# Patient Record
Sex: MALE | Race: WHITE | NOT HISPANIC OR LATINO | Employment: OTHER | URBAN - METROPOLITAN AREA
[De-identification: names, ages, dates, MRNs, and addresses within clinical notes are randomized per-mention and may not be internally consistent; named-entity substitution may affect disease eponyms.]

---

## 2018-06-23 ENCOUNTER — HOSPITAL ENCOUNTER (EMERGENCY)
Facility: HOSPITAL | Age: 63
End: 2018-06-25
Attending: EMERGENCY MEDICINE | Admitting: PSYCHIATRY & NEUROLOGY
Payer: COMMERCIAL

## 2018-06-23 DIAGNOSIS — Z00.00 ROUTINE HISTORY AND PHYSICAL EXAMINATION OF ADULT: ICD-10-CM

## 2018-06-23 DIAGNOSIS — T42.4X2A INTENTIONAL BENZODIAZEPINE OVERDOSE, INITIAL ENCOUNTER (HCC): Primary | ICD-10-CM

## 2018-06-23 DIAGNOSIS — Z00.00 PHYSICAL EXAM: ICD-10-CM

## 2018-06-23 DIAGNOSIS — R45.851 SUICIDAL INTENT: ICD-10-CM

## 2018-06-23 DIAGNOSIS — T50.902A INTENTIONAL OVERDOSE OF DRUG IN TABLET FORM (HCC): ICD-10-CM

## 2018-06-23 LAB
ALBUMIN SERPL BCP-MCNC: 3.6 G/DL (ref 3.5–5)
ALP SERPL-CCNC: 59 U/L (ref 46–116)
ALT SERPL W P-5'-P-CCNC: 29 U/L (ref 12–78)
AMPHETAMINES SERPL QL SCN: NEGATIVE
ANION GAP SERPL CALCULATED.3IONS-SCNC: 8 MMOL/L (ref 4–13)
APAP SERPL-MCNC: <2 UG/ML (ref 10–30)
AST SERPL W P-5'-P-CCNC: 27 U/L (ref 5–45)
BARBITURATES UR QL: NEGATIVE
BASOPHILS # BLD AUTO: 0.06 THOUSANDS/ΜL (ref 0–0.1)
BASOPHILS NFR BLD AUTO: 1 % (ref 0–1)
BENZODIAZ UR QL: POSITIVE
BILIRUB SERPL-MCNC: 0.83 MG/DL (ref 0.2–1)
BUN SERPL-MCNC: 17 MG/DL (ref 5–25)
CALCIUM SERPL-MCNC: 9.1 MG/DL (ref 8.3–10.1)
CHLORIDE SERPL-SCNC: 103 MMOL/L (ref 100–108)
CO2 SERPL-SCNC: 31 MMOL/L (ref 21–32)
COCAINE UR QL: NEGATIVE
CREAT SERPL-MCNC: 0.99 MG/DL (ref 0.6–1.3)
EOSINOPHIL # BLD AUTO: 0.18 THOUSAND/ΜL (ref 0–0.61)
EOSINOPHIL NFR BLD AUTO: 2 % (ref 0–6)
ERYTHROCYTE [DISTWIDTH] IN BLOOD BY AUTOMATED COUNT: 13.5 % (ref 11.6–15.1)
ETHANOL SERPL-MCNC: <3 MG/DL (ref 0–3)
GFR SERPL CREATININE-BSD FRML MDRD: 81 ML/MIN/1.73SQ M
GLUCOSE SERPL-MCNC: 67 MG/DL (ref 65–140)
HCT VFR BLD AUTO: 47.8 % (ref 36.5–49.3)
HGB BLD-MCNC: 16.1 G/DL (ref 12–17)
LYMPHOCYTES # BLD AUTO: 1.55 THOUSANDS/ΜL (ref 0.6–4.47)
LYMPHOCYTES NFR BLD AUTO: 18 % (ref 14–44)
MCH RBC QN AUTO: 32.7 PG (ref 26.8–34.3)
MCHC RBC AUTO-ENTMCNC: 33.7 G/DL (ref 31.4–37.4)
MCV RBC AUTO: 97 FL (ref 82–98)
METHADONE UR QL: NEGATIVE
MONOCYTES # BLD AUTO: 0.76 THOUSAND/ΜL (ref 0.17–1.22)
MONOCYTES NFR BLD AUTO: 9 % (ref 4–12)
NEUTROPHILS # BLD AUTO: 6.07 THOUSANDS/ΜL (ref 1.85–7.62)
NEUTS SEG NFR BLD AUTO: 70 % (ref 43–75)
NRBC BLD AUTO-RTO: 0 /100 WBCS
OPIATES UR QL SCN: NEGATIVE
PCP UR QL: NEGATIVE
PLATELET # BLD AUTO: 89 THOUSANDS/UL (ref 149–390)
PMV BLD AUTO: 11 FL (ref 8.9–12.7)
POTASSIUM SERPL-SCNC: 4.8 MMOL/L (ref 3.5–5.3)
PROT SERPL-MCNC: 6.5 G/DL (ref 6.4–8.2)
RBC # BLD AUTO: 4.93 MILLION/UL (ref 3.88–5.62)
SALICYLATES SERPL-MCNC: <3 MG/DL (ref 3–20)
SODIUM SERPL-SCNC: 142 MMOL/L (ref 136–145)
THC UR QL: NEGATIVE
TSH SERPL DL<=0.05 MIU/L-ACNC: 0.45 UIU/ML (ref 0.36–3.74)
WBC # BLD AUTO: 8.62 THOUSAND/UL (ref 4.31–10.16)

## 2018-06-23 PROCEDURE — 85025 COMPLETE CBC W/AUTO DIFF WBC: CPT | Performed by: EMERGENCY MEDICINE

## 2018-06-23 PROCEDURE — 84443 ASSAY THYROID STIM HORMONE: CPT | Performed by: EMERGENCY MEDICINE

## 2018-06-23 PROCEDURE — 93005 ELECTROCARDIOGRAM TRACING: CPT

## 2018-06-23 PROCEDURE — 80329 ANALGESICS NON-OPIOID 1 OR 2: CPT | Performed by: EMERGENCY MEDICINE

## 2018-06-23 PROCEDURE — 80320 DRUG SCREEN QUANTALCOHOLS: CPT | Performed by: EMERGENCY MEDICINE

## 2018-06-23 PROCEDURE — 80307 DRUG TEST PRSMV CHEM ANLYZR: CPT | Performed by: EMERGENCY MEDICINE

## 2018-06-23 PROCEDURE — 36415 COLL VENOUS BLD VENIPUNCTURE: CPT | Performed by: EMERGENCY MEDICINE

## 2018-06-23 PROCEDURE — 96360 HYDRATION IV INFUSION INIT: CPT

## 2018-06-23 PROCEDURE — 80053 COMPREHEN METABOLIC PANEL: CPT | Performed by: EMERGENCY MEDICINE

## 2018-06-23 RX ORDER — BIOTIN 1 MG
5000 TABLET ORAL DAILY
COMMUNITY

## 2018-06-23 RX ORDER — ALLOPURINOL 100 MG/1
100 TABLET ORAL 2 TIMES DAILY
COMMUNITY
Start: 2018-05-18 | End: 2018-06-27 | Stop reason: HOSPADM

## 2018-06-23 RX ORDER — UBIDECARENONE 75 MG
1000 CAPSULE ORAL DAILY
COMMUNITY

## 2018-06-23 RX ORDER — ALPRAZOLAM 0.5 MG/1
0.5 TABLET ORAL 2 TIMES DAILY
COMMUNITY
Start: 2018-06-15 | End: 2018-06-24

## 2018-06-23 RX ORDER — LISINOPRIL 5 MG/1
5 TABLET ORAL DAILY
COMMUNITY
Start: 2018-05-18 | End: 2018-06-27 | Stop reason: HOSPADM

## 2018-06-23 RX ORDER — LEVOTHYROXINE SODIUM 0.1 MG/1
100 TABLET ORAL DAILY
COMMUNITY
Start: 2018-05-18 | End: 2018-06-27 | Stop reason: HOSPADM

## 2018-06-23 RX ORDER — ASCORBIC ACID 100 MG
100 TABLET,CHEWABLE ORAL DAILY
COMMUNITY

## 2018-06-23 RX ADMIN — SODIUM CHLORIDE 1000 ML: 0.9 INJECTION, SOLUTION INTRAVENOUS at 16:13

## 2018-06-23 NOTE — ED NOTES
Visitor Narcisa Pulliam) left contact information for patient  [phone]       Liberty Cobos  06/23/18 1554

## 2018-06-23 NOTE — ED PROVIDER NOTES
History  Chief Complaint   Patient presents with    Overdose - Intentional     Patient arrives via EMS from home  Patient reports taking 20 0 5mg Xanax and 30 0 05mg Synthroid this morning around 9am or 10am in an attempt to kill himself  Patient reports SI on and off for an unspecified amount of time  60 yo male brought from a residence where he has a self contained living space, but with roommates who share the common area, for alleged overdose on prescription medication  He freely discloses that he took approximately #20 alprazolam 0 5 mg pills and approximately #30 synthroid 50mcg pills, which corresponds to about a handful of each at 9am   He says he took them with the intent to die, citing nonspecific depression symptoms and a few feeling that "the world would be better without him "  He is actually not sure how the incident came to light, because as far as he knew he was in his apartment, and he just recalls being picked up by paramedics  He did not seek help himself, although now he is cooperative and agreeable  He is visibly under the influence of sedative hypnotic c/w benzodiazepine, somnolent but arousable, and slurs his words, but is deliberate and contemplative with his words, oriented x4  In the course of my conversation with him, he admitted to previous attempt by overdose a few years ago ,and asked me "why if someone is determined to kill themselves do you try to save them "          History provided by:  Patient      Prior to Admission Medications   Prescriptions Last Dose Informant Patient Reported? Taking?    Ascorbic Acid (VITAMIN C) 100 MG CHEW   Yes No   Sig: Chew 100 mg daily   Cholecalciferol (VITAMIN D3) 1000 units CAPS   Yes No   Sig: Take 5,000 Units by mouth daily   allopurinol (ZYLOPRIM) 100 mg tablet   Yes Yes   Sig: Take 100 mg by mouth 2 (two) times a day   cyanocobalamin (VITAMIN B-12) 100 mcg tablet   Yes No   Sig: Take 1,000 mcg by mouth daily   levothyroxine 100 mcg tablet   Yes Yes   Sig: Take 100 mcg by mouth daily   lisinopril (ZESTRIL) 5 mg tablet   Yes Yes   Sig: Take 5 mg by mouth daily      Facility-Administered Medications: None       Past Medical History:   Diagnosis Date    Anxiety     Depression     Disease of thyroid gland     Hypertension     Panic attack        Past Surgical History:   Procedure Laterality Date    APPENDECTOMY      COSMETIC SURGERY      HERNIA REPAIR      JOINT REPLACEMENT         History reviewed  No pertinent family history  I have reviewed and agree with the history as documented  Social History   Substance Use Topics    Smoking status: Former Smoker     Quit date: 6/25/2013    Smokeless tobacco: Never Used    Alcohol use 1 2 oz/week     2 Glasses of wine per week      Comment: Wine with dinner everyday  Review of Systems   Constitutional: Negative for appetite change, chills and fever  HENT: Negative for sore throat  Respiratory: Negative for cough, shortness of breath and wheezing  Cardiovascular: Negative for chest pain and palpitations  Gastrointestinal: Negative for abdominal pain, diarrhea, nausea and vomiting  Genitourinary: Negative for dysuria and hematuria  Musculoskeletal: Negative for neck pain  Skin: Negative for rash  Neurological: Negative for dizziness, weakness and headaches  Psychiatric/Behavioral: Positive for suicidal ideas  All other systems reviewed and are negative  Physical Exam  Physical Exam   Constitutional: He is oriented to person, place, and time  He appears well-developed and well-nourished  HENT:   Head: Normocephalic and atraumatic  Right Ear: External ear normal    Left Ear: External ear normal    Nose: Nose normal    Mouth/Throat: Oropharynx is clear and moist    Eyes: Conjunctivae and EOM are normal  Pupils are equal, round, and reactive to light  Neck: Normal range of motion  Neck supple  Cardiovascular: Normal rate and regular rhythm  Pulmonary/Chest: Effort normal    Abdominal: There is no tenderness  Musculoskeletal: Normal range of motion  Neurological: He is alert and oriented to person, place, and time  No cranial nerve deficit or sensory deficit  Coordination normal  GCS eye subscore is 3  GCS verbal subscore is 5  GCS motor subscore is 6  Skin: Skin is warm and dry  Capillary refill takes less than 2 seconds  Psychiatric: He has a normal mood and affect  Thought content normal  His speech is slurred  He is slowed  He expresses impulsivity (he actually said "this was impulsive "  )  Nursing note and vitals reviewed        Vital Signs  ED Triage Vitals [06/23/18 1507]   Temperature Pulse Respirations Blood Pressure SpO2   97 5 °F (36 4 °C) 70 14 140/89 100 %      Temp Source Heart Rate Source Patient Position - Orthostatic VS BP Location FiO2 (%)   Oral Monitor Lying Right arm --      Pain Score       No Pain           Vitals:    06/24/18 0907 06/24/18 1545 06/24/18 1858 06/24/18 1901   BP: 121/90 134/78 121/58 121/58   Pulse:  84 80    Patient Position - Orthostatic VS:  Lying Lying Lying       Visual Acuity  Visual Acuity      Most Recent Value   L Pupil Size (mm)  3   R Pupil Size (mm)  3          ED Medications  Medications   sodium chloride 0 9 % bolus 1,000 mL (0 mL Intravenous Stopped 6/23/18 1718)       Diagnostic Studies  Results Reviewed     Procedure Component Value Units Date/Time    Comprehensive metabolic panel [[id number]] Collected:  06/24/18 0943    Lab Status:  Final result Specimen:  Blood from Arm, Left Updated:  06/24/18 1021     Sodium 139 mmol/L      Potassium 4 2 mmol/L      Chloride 102 mmol/L      CO2 29 mmol/L      Anion Gap 8 mmol/L      BUN 18 mg/dL      Creatinine 1 18 mg/dL      Glucose 122 mg/dL      Calcium 9 3 mg/dL      AST 30 U/L      ALT 30 U/L      Alkaline Phosphatase 64 U/L      Total Protein 6 8 g/dL      Albumin 3 6 g/dL      Total Bilirubin 0 82 mg/dL      eGFR 66 ml/min/1 73sq m Narrative:         National Kidney Disease Education Program recommendations are as follows:  GFR calculation is accurate only with a steady state creatinine  Chronic Kidney disease less than 60 ml/min/1 73 sq  meters  Kidney failure less than 15 ml/min/1 73 sq  meters  TSH [[id number]]  (Abnormal) Collected:  06/24/18 0943    Lab Status:  Final result Specimen:  Blood from Arm, Left Updated:  06/24/18 1021     TSH 3RD GENERATON 0 219 (L) uIU/mL     Narrative:         Patients undergoing fluorescein dye angiography may retain small amounts of fluorescein in the body for 48-72 hours post procedure  Samples containing fluorescein can produce falsely depressed TSH values  If the patient had this procedure,a specimen should be resubmitted post fluorescein clearance      Urine Microscopic [[id number]]  (Abnormal) Collected:  06/24/18 0946    Lab Status:  Final result Specimen:  Urine from Urine, Clean Catch Updated:  06/24/18 0956     RBC, UA None Seen /hpf      WBC, UA None Seen /hpf      Epithelial Cells Occasional /hpf      Bacteria, UA Occasional /hpf      MUCOUS THREADS Moderate (A)    CBC and differential [[id number]]  (Abnormal) Collected:  06/24/18 0943    Lab Status:  Final result Specimen:  Blood from Arm, Left Updated:  06/24/18 0949     WBC 7 22 Thousand/uL      RBC 4 96 Million/uL      Hemoglobin 16 2 g/dL      Hematocrit 47 9 %      MCV 97 fL      MCH 32 7 pg      MCHC 33 8 g/dL      RDW 13 6 %      MPV 11 2 fL      Platelets 249 (L) Thousands/uL      nRBC 0 /100 WBCs      Neutrophils Relative 63 %      Lymphocytes Relative 25 %      Monocytes Relative 8 %      Eosinophils Relative 4 %      Basophils Relative 1 %      Neutrophils Absolute 4 52 Thousands/µL      Lymphocytes Absolute 1 82 Thousands/µL      Monocytes Absolute 0 57 Thousand/µL      Eosinophils Absolute 0 25 Thousand/µL      Basophils Absolute 0 06 Thousands/µL     POCT urinalysis dipstick [[id number]]  (Normal) Resulted:  06/24/18 0946    Lab Status: Final result Specimen:  Urine Updated:  06/24/18 0946     Color, UA yellow    ED Urine Macroscopic [[id number]]  (Abnormal) Collected:  06/24/18 0946    Lab Status:  Final result Specimen:  Urine Updated:  06/24/18 0941     Color, UA Yellow     Clarity, UA Clear     pH, UA 5 0     Leukocytes, UA Negative     Nitrite, UA Negative     Protein,  (2+) (A) mg/dl      Glucose, UA Negative mg/dl      Ketones, UA Negative mg/dl      Urobilinogen, UA 0 2 E U /dl      Bilirubin, UA Negative     Blood, UA Negative     Specific Gravity, UA 1 020    Narrative:       CLINITEK RESULT    Rapid drug screen, urine [[id number]]  (Abnormal) Collected:  06/23/18 2023    Lab Status:  Final result Specimen:  Urine from Urine, Clean Catch Updated:  06/23/18 2046     Amph/Meth UR Negative     Barbiturate Ur Negative     Benzodiazepine Urine Positive (A)     Cocaine Urine Negative     Methadone Urine Negative     Opiate Urine Negative     PCP Ur Negative     THC Urine Negative    Narrative:         Presumptive report  If requested, specimen will be sent to reference lab for confirmation  FOR MEDICAL PURPOSES ONLY  IF CONFIRMATION NEEDED PLEASE CONTACT THE LAB WITHIN 5 DAYS      Drug Screen Cutoff Levels:  AMPHETAMINE/METHAMPHETAMINES  1000 ng/mL  BARBITURATES     200 ng/mL  BENZODIAZEPINES     200 ng/mL  COCAINE      300 ng/mL  METHADONE      300 ng/mL  OPIATES      300 ng/mL  PHENCYCLIDINE     25 ng/mL  THC       50 ng/mL    Acetaminophen level [[id number]]  (Abnormal) Collected:  06/23/18 1611    Lab Status:  Final result Specimen:  Blood from Arm, Left Updated:  06/23/18 1659     Acetaminophen Level <2 0 (L) ug/mL     TSH [[id number]]  (Normal) Collected:  06/23/18 1611    Lab Status:  Final result Specimen:  Blood from Arm, Left Updated:  06/23/18 1645     TSH 3RD GENERATON 0 445 uIU/mL     Narrative:         Patients undergoing fluorescein dye angiography may retain small amounts of fluorescein in the body for 48-72 hours post procedure  Samples containing fluorescein can produce falsely depressed TSH values  If the patient had this procedure,a specimen should be resubmitted post fluorescein clearance      Salicylate level [[id number]]  (Abnormal) Collected:  06/23/18 1611    Lab Status:  Final result Specimen:  Blood from Arm, Left Updated:  18/60/69 5227     Salicylate Lvl <3 (L) mg/dL     Ethanol [[id number]]  (Normal) Collected:  06/23/18 1611    Lab Status:  Final result Specimen:  Blood from Arm, Left Updated:  06/23/18 1640     Ethanol Lvl <3 mg/dL     CBC and differential [[id number]]  (Abnormal) Collected:  06/23/18 1611    Lab Status:  Final result Specimen:  Blood from Arm, Left Updated:  06/23/18 1639     WBC 8 62 Thousand/uL      RBC 4 93 Million/uL      Hemoglobin 16 1 g/dL      Hematocrit 47 8 %      MCV 97 fL      MCH 32 7 pg      MCHC 33 7 g/dL      RDW 13 5 %      MPV 11 0 fL      Platelets 89 (L) Thousands/uL      nRBC 0 /100 WBCs      Neutrophils Relative 70 %      Lymphocytes Relative 18 %      Monocytes Relative 9 %      Eosinophils Relative 2 %      Basophils Relative 1 %      Neutrophils Absolute 6 07 Thousands/µL      Lymphocytes Absolute 1 55 Thousands/µL      Monocytes Absolute 0 76 Thousand/µL      Eosinophils Absolute 0 18 Thousand/µL      Basophils Absolute 0 06 Thousands/µL     Comprehensive metabolic panel [[id number]] Collected:  06/23/18 1611    Lab Status:  Final result Specimen:  Blood from Arm, Left Updated:  06/23/18 1636     Sodium 142 mmol/L      Potassium 4 8 mmol/L      Chloride 103 mmol/L      CO2 31 mmol/L      Anion Gap 8 mmol/L      BUN 17 mg/dL      Creatinine 0 99 mg/dL      Glucose 67 mg/dL      Calcium 9 1 mg/dL      AST 27 U/L      ALT 29 U/L      Alkaline Phosphatase 59 U/L      Total Protein 6 5 g/dL      Albumin 3 6 g/dL      Total Bilirubin 0 83 mg/dL      eGFR 81 ml/min/1 73sq m     Narrative:         National Kidney Disease Education Program recommendations are as follows:  GFR calculation is accurate only with a steady state creatinine  Chronic Kidney disease less than 60 ml/min/1 73 sq  meters  Kidney failure less than 15 ml/min/1 73 sq  meters  No orders to display              Procedures  ECG 12 Lead Documentation  Date/Time: 6/23/2018 3:20 PM  Performed by: Yoav Vaughn  Authorized by: Yoav Vaughn     Rate:     ECG rate:  76  Rhythm:     Rhythm: sinus rhythm    Ectopy:     Ectopy: PVCs    ST segments:     ST segments:  Normal  T waves:     T waves: normal             Phone Contacts  ED Phone Contact    ED Course  ED Course as of Jun 25 0650   Sat Jun 23, 2018   1551 Reviewed PA , confirmed alprazolam 0 5 mg filled 6/16/18    1646 He has no adverse effects of alleged levothyroxine, and he is just exhibiting the sedative effects of  BZD without any respiratory depression nor airway control issues at this time    1900 I have handed over care to Dr Helen White for continued observation, with the goal of crisis evaluation  He is sitting up, purposeful, still under influence of sedative effects but improving  Sun Jun 24, 2018   0630 After he rested through the night under care of supervising physician overnight, I am managing him again this morning   He is waiting for bed approval       0040 I have been told that accepting facility in [place] is asking for repeat labs to confirm that they are still as normal as they were yesterday, and a full 24 hour observation period and to confirm that he is as alert and oriented as he has been in the interval since arrival       1408 Patient has been under observation for 24 hours with no abnormal or concerning sequelae of alleged overdose  Workup is normal   He is considered medically cleared for transport to inpatient psychiatric unit                                    [facility] Time    Disposition  Final diagnoses:   Intentional benzodiazepine overdose, initial encounter [facility])   Intentional overdose of drug in tablet form [facility])   Suicidal intent     Time reflects when diagnosis was documented in both MDM as applicable and the Disposition within this note     Time User Action Codes Description Comment    6/23/2018  4:50 PM Van, [address] Intentional benzodiazepine overdose, initial encounter ([facility] [address] )     6/23/2018  4:50 PM [address] Intentional overdose of drug in tablet form ([facility] [address] )     6/23/2018  4:50 PM Van, [address] Suicidal intent     6/24/2018  1:11 PM Pam Cody Add [Z00 00] Physical exam     6/24/2018  1:12 PM Maci Cody Prost C Add [Z00 00] Routine history and physical examination of adult     6/24/2018  1:12 PM Maci Cody C Modify [Z00 00] Routine history and physical examination of adult       ED Disposition     ED Disposition Condition Comment    Transfer to Another [address] should be transferred out to [facility]        MD Documentation      Most Recent Value   Patient Condition  The patient has been stabilized such that within reasonable medical probability, no material deterioration of the patient condition or the condition of the unborn child(los) is likely to result from the transfer   Reason for Transfer  Level of Care needed not available at this facility   Benefits of Transfer  Other benefits (Include comment)_______________________ Sparkle Delude and stabilize]   Risks of Transfer  Potential for delay in receiving treatment [Inpatient Psychiatry]   Accepting Physician  Dr Bahman Joshi Name, Mirela Aparicio    (Name & Tel number)  Mandi Hope [phone]   Transported by (Company and Unit #)  [facility]   Sending MD Dr Dubose   Provider Certification  General risk, such as traffic hazards, adverse weather conditions, rough terrain or turbulence, possible failure of equipment (including vehicle or aircraft), or consequences of actions of persons outside the control of the transport personnel, The patient is stable for psychiatric transfer because they are medically stable, and is protected from harming him/herself or others during transport, The possibility of a transport vehicle being unavailable      RN Documentation      Most [address] Name, [address]    (Name & Tel number)  Yane Randolph [phone]   Report Given to  Report to be called at [phone]   Transported by [employer] and Unit #)  [facility]   Level of Care  Basic life support      Follow-up Information    None         Discharge Medication List as of 6/25/2018  1:01 AM      CONTINUE these medications which have NOT CHANGED    Details   allopurinol (ZYLOPRIM) 100 mg tablet Take 100 mg by mouth 2 (two) times a day, Starting Fri 5/18/2018, Historical Med      Ascorbic Acid (VITAMIN C) 100 MG CHEW Chew 100 mg daily, Historical Med      Cholecalciferol (VITAMIN D3) 1000 units CAPS Take 5,000 Units by mouth daily, Historical Med      cyanocobalamin (VITAMIN B-12) 100 mcg tablet Take 1,000 mcg by mouth daily, Historical Med      levothyroxine 100 mcg tablet Take 100 mcg by mouth daily, Starting Fri 5/18/2018, Historical Med      lisinopril (ZESTRIL) 5 mg tablet Take 5 mg by mouth daily, Starting Fri 5/18/2018, Historical Med           No discharge procedures on file      ED Provider  Electronically Signed by           Apollo Casillas MD  06/25/18 8104

## 2018-06-23 NOTE — ED NOTES
Jose F from crisis will be in shortly, after he is finished at [facility] to see patient     Trevin Paz, SULY  06/23/18 5999

## 2018-06-23 NOTE — ED NOTES
Patient sleeping in bed, B/L side rails up so patient does not fall [address], RN  06/23/18 6677

## 2018-06-23 NOTE — ED NOTES
Lela Pedroza expresses concerns about patient being able to call for assistance if needed  RN explains that patient is under continual observation by monitors and auditory in room  If patient needs something he can call for nurse or get OOB and come to window for assist  RN explains that patient's cannot have call bells in these rooms due to safety concerns         Kian Alcaraz RN  06/23/18 2259

## 2018-06-23 NOTE — ED NOTES
Per Dr Jose Alejandro Smith patient medically cleared at this time        Orma Goltz, RN  06/23/18 8976

## 2018-06-23 NOTE — ED NOTES
Patient requesting for credit card in wallet to be given to family friend Morteza Johnson  Credit card given to Morteza Johnson  Wallet and belongings put back into locker and secured by SULY Milan  06/23/18 7141

## 2018-06-23 NOTE — ED NOTES
Spoke with Yasmin Billy from Crisis, aware of patient and consult   Per Denzel Back from crisis is on for [facility] and she will speak with him to see if he needs assistance with the consult for this patient     Griselda Singh RN  06/23/18 3897

## 2018-06-23 NOTE — ED NOTES
Pt is to sedated to talk to CW at this time  CW will attempt to evaluate the pt once he is more alert

## 2018-06-24 VITALS
OXYGEN SATURATION: 99 % | SYSTOLIC BLOOD PRESSURE: 121 MMHG | RESPIRATION RATE: 16 BRPM | WEIGHT: 198.19 LBS | HEART RATE: 80 BPM | DIASTOLIC BLOOD PRESSURE: 58 MMHG | TEMPERATURE: 98.1 F

## 2018-06-24 PROBLEM — R63.4 WEIGHT LOSS: Status: ACTIVE | Noted: 2017-08-17

## 2018-06-24 PROBLEM — T50.902D OVERDOSE, INTENTIONAL SELF-HARM, SUBSEQUENT ENCOUNTER: Status: ACTIVE | Noted: 2018-06-24

## 2018-06-24 PROBLEM — L98.7 EXCESS SKIN OF ABDOMINAL WALL: Status: ACTIVE | Noted: 2017-08-17

## 2018-06-24 PROBLEM — R53.83 TIREDNESS: Status: ACTIVE | Noted: 2018-03-12

## 2018-06-24 PROBLEM — N40.2 PROSTATE NODULE: Status: ACTIVE | Noted: 2017-04-13

## 2018-06-24 PROBLEM — D69.6 THROMBOCYTOPENIA (HCC): Status: ACTIVE | Noted: 2017-08-17

## 2018-06-24 LAB
ALBUMIN SERPL BCP-MCNC: 3.6 G/DL (ref 3.5–5)
ALP SERPL-CCNC: 64 U/L (ref 46–116)
ALT SERPL W P-5'-P-CCNC: 30 U/L (ref 12–78)
ANION GAP SERPL CALCULATED.3IONS-SCNC: 8 MMOL/L (ref 4–13)
AST SERPL W P-5'-P-CCNC: 30 U/L (ref 5–45)
ATRIAL RATE: 76 BPM
BACTERIA UR QL AUTO: ABNORMAL /HPF
BASOPHILS # BLD AUTO: 0.06 THOUSANDS/ΜL (ref 0–0.1)
BASOPHILS NFR BLD AUTO: 1 % (ref 0–1)
BILIRUB SERPL-MCNC: 0.82 MG/DL (ref 0.2–1)
BILIRUB UR QL STRIP: NEGATIVE
BUN SERPL-MCNC: 18 MG/DL (ref 5–25)
CALCIUM SERPL-MCNC: 9.3 MG/DL (ref 8.3–10.1)
CHLORIDE SERPL-SCNC: 102 MMOL/L (ref 100–108)
CLARITY UR: CLEAR
CO2 SERPL-SCNC: 29 MMOL/L (ref 21–32)
COLOR UR: YELLOW
COLOR, POC: YELLOW
CREAT SERPL-MCNC: 1.18 MG/DL (ref 0.6–1.3)
EOSINOPHIL # BLD AUTO: 0.25 THOUSAND/ΜL (ref 0–0.61)
EOSINOPHIL NFR BLD AUTO: 4 % (ref 0–6)
ERYTHROCYTE [DISTWIDTH] IN BLOOD BY AUTOMATED COUNT: 13.6 % (ref 11.6–15.1)
GFR SERPL CREATININE-BSD FRML MDRD: 66 ML/MIN/1.73SQ M
GLUCOSE SERPL-MCNC: 122 MG/DL (ref 65–140)
GLUCOSE UR STRIP-MCNC: NEGATIVE MG/DL
HCT VFR BLD AUTO: 47.9 % (ref 36.5–49.3)
HGB BLD-MCNC: 16.2 G/DL (ref 12–17)
HGB UR QL STRIP.AUTO: NEGATIVE
KETONES UR STRIP-MCNC: NEGATIVE MG/DL
LEUKOCYTE ESTERASE UR QL STRIP: NEGATIVE
LYMPHOCYTES # BLD AUTO: 1.82 THOUSANDS/ΜL (ref 0.6–4.47)
LYMPHOCYTES NFR BLD AUTO: 25 % (ref 14–44)
MCH RBC QN AUTO: 32.7 PG (ref 26.8–34.3)
MCHC RBC AUTO-ENTMCNC: 33.8 G/DL (ref 31.4–37.4)
MCV RBC AUTO: 97 FL (ref 82–98)
MONOCYTES # BLD AUTO: 0.57 THOUSAND/ΜL (ref 0.17–1.22)
MONOCYTES NFR BLD AUTO: 8 % (ref 4–12)
MUCOUS THREADS UR QL AUTO: ABNORMAL
NEUTROPHILS # BLD AUTO: 4.52 THOUSANDS/ΜL (ref 1.85–7.62)
NEUTS SEG NFR BLD AUTO: 63 % (ref 43–75)
NITRITE UR QL STRIP: NEGATIVE
NON-SQ EPI CELLS URNS QL MICRO: ABNORMAL /HPF
NRBC BLD AUTO-RTO: 0 /100 WBCS
P AXIS: 75 DEGREES
PH UR STRIP.AUTO: 5 [PH] (ref 4.5–8)
PLATELET # BLD AUTO: 100 THOUSANDS/UL (ref 149–390)
PMV BLD AUTO: 11.2 FL (ref 8.9–12.7)
POTASSIUM SERPL-SCNC: 4.2 MMOL/L (ref 3.5–5.3)
PR INTERVAL: 188 MS
PROT SERPL-MCNC: 6.8 G/DL (ref 6.4–8.2)
PROT UR STRIP-MCNC: ABNORMAL MG/DL
QRS AXIS: 22 DEGREES
QRSD INTERVAL: 108 MS
QT INTERVAL: 380 MS
QTC INTERVAL: 427 MS
RBC # BLD AUTO: 4.96 MILLION/UL (ref 3.88–5.62)
RBC #/AREA URNS AUTO: ABNORMAL /HPF
SODIUM SERPL-SCNC: 139 MMOL/L (ref 136–145)
SP GR UR STRIP.AUTO: 1.02 (ref 1–1.03)
T WAVE AXIS: 75 DEGREES
TSH SERPL DL<=0.05 MIU/L-ACNC: 0.22 UIU/ML (ref 0.36–3.74)
UROBILINOGEN UR QL STRIP.AUTO: 0.2 E.U./DL
VENTRICULAR RATE: 76 BPM
WBC # BLD AUTO: 7.22 THOUSAND/UL (ref 4.31–10.16)
WBC #/AREA URNS AUTO: ABNORMAL /HPF

## 2018-06-24 PROCEDURE — 84443 ASSAY THYROID STIM HORMONE: CPT | Performed by: EMERGENCY MEDICINE

## 2018-06-24 PROCEDURE — 81001 URINALYSIS AUTO W/SCOPE: CPT

## 2018-06-24 PROCEDURE — 36415 COLL VENOUS BLD VENIPUNCTURE: CPT | Performed by: EMERGENCY MEDICINE

## 2018-06-24 PROCEDURE — 80053 COMPREHEN METABOLIC PANEL: CPT | Performed by: EMERGENCY MEDICINE

## 2018-06-24 PROCEDURE — 93010 ELECTROCARDIOGRAM REPORT: CPT | Performed by: INTERNAL MEDICINE

## 2018-06-24 PROCEDURE — 81002 URINALYSIS NONAUTO W/O SCOPE: CPT | Performed by: EMERGENCY MEDICINE

## 2018-06-24 PROCEDURE — 85025 COMPLETE CBC W/AUTO DIFF WBC: CPT | Performed by: EMERGENCY MEDICINE

## 2018-06-24 RX ORDER — CHOLECALCIFEROL (VITAMIN D3) 125 MCG
1000 CAPSULE ORAL DAILY
Status: CANCELLED | OUTPATIENT
Start: 2018-06-24

## 2018-06-24 RX ORDER — ALLOPURINOL 100 MG/1
100 TABLET ORAL 2 TIMES DAILY
Status: DISCONTINUED | OUTPATIENT
Start: 2018-06-24 | End: 2018-06-25 | Stop reason: HOSPADM

## 2018-06-24 RX ORDER — LEVOTHYROXINE SODIUM 0.05 MG/1
100 TABLET ORAL DAILY
Status: CANCELLED | OUTPATIENT
Start: 2018-06-24

## 2018-06-24 RX ORDER — LISINOPRIL 5 MG/1
5 TABLET ORAL DAILY
Status: CANCELLED | OUTPATIENT
Start: 2018-06-24

## 2018-06-24 RX ORDER — LEVOTHYROXINE SODIUM 0.05 MG/1
100 TABLET ORAL
Status: DISCONTINUED | OUTPATIENT
Start: 2018-06-24 | End: 2018-06-25 | Stop reason: HOSPADM

## 2018-06-24 RX ORDER — BIOTIN 1 MG
5000 TABLET ORAL DAILY
Status: CANCELLED | OUTPATIENT
Start: 2018-06-24

## 2018-06-24 RX ORDER — ALPRAZOLAM 0.5 MG/1
0.5 TABLET ORAL 2 TIMES DAILY
Status: DISCONTINUED | OUTPATIENT
Start: 2018-06-24 | End: 2018-06-25 | Stop reason: HOSPADM

## 2018-06-24 RX ORDER — LORAZEPAM 0.5 MG/1
0.5 TABLET ORAL EVERY 8 HOURS PRN
Status: CANCELLED | OUTPATIENT
Start: 2018-06-24

## 2018-06-24 RX ORDER — ACETAMINOPHEN 325 MG/1
650 TABLET ORAL EVERY 4 HOURS PRN
Status: CANCELLED | OUTPATIENT
Start: 2018-06-24

## 2018-06-24 RX ORDER — TRAZODONE HYDROCHLORIDE 50 MG/1
25 TABLET ORAL
Status: CANCELLED | OUTPATIENT
Start: 2018-06-24

## 2018-06-24 RX ORDER — ALLOPURINOL 100 MG/1
100 TABLET ORAL 2 TIMES DAILY
Status: CANCELLED | OUTPATIENT
Start: 2018-06-24

## 2018-06-24 RX ORDER — ASCORBIC ACID 100 MG
100 TABLET,CHEWABLE ORAL DAILY
Status: CANCELLED | OUTPATIENT
Start: 2018-06-24

## 2018-06-24 RX ORDER — NICOTINE 21 MG/24HR
1 PATCH, TRANSDERMAL 24 HOURS TRANSDERMAL DAILY
Status: CANCELLED | OUTPATIENT
Start: 2018-06-24

## 2018-06-24 RX ORDER — LISINOPRIL 5 MG/1
5 TABLET ORAL DAILY
Status: DISCONTINUED | OUTPATIENT
Start: 2018-06-24 | End: 2018-06-25 | Stop reason: HOSPADM

## 2018-06-24 RX ADMIN — ALLOPURINOL 100 MG: 100 TABLET ORAL at 19:23

## 2018-06-24 RX ADMIN — ALPRAZOLAM 0.5 MG: 0.5 TABLET ORAL at 19:20

## 2018-06-24 RX ADMIN — LEVOTHYROXINE SODIUM 100 MCG: 50 TABLET ORAL at 08:06

## 2018-06-24 RX ADMIN — ALLOPURINOL 100 MG: 100 TABLET ORAL at 09:06

## 2018-06-24 RX ADMIN — ALPRAZOLAM 0.5 MG: 0.5 TABLET ORAL at 09:06

## 2018-06-24 RX ADMIN — LISINOPRIL 5 MG: 5 TABLET ORAL at 09:07

## 2018-06-24 NOTE — ED NOTES
PC with [facility], [address], confirmed they have a bed and will review for their geriatric unit  [facility] requested new labs, including urinalysis  They also requested 24 hour observation in ED before accepting patient due to OD  [facility] [place] also requires a hand written 201  Nurse made aware

## 2018-06-24 NOTE — ED NOTES
Daniele Pedro, patients counselor, left department  Patient remains calm, cooperative, ambulating to bathroom       Aster Umana, SULY  06/24/18 600 [address], RN  06/24/18 8565

## 2018-06-24 NOTE — ED NOTES
Patient states "I actually didn't mean no visitors completely    Specifically I do not want Allean Onondaga to come and visit"     Laura Espino RN  06/24/18 9273

## 2018-06-24 NOTE — ED NOTES
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

## 2018-06-24 NOTE — ED NOTES
Osbaldo Tang called asking if patient is still in ER  Spoke w/patient regarding Lois Chong and her question  Offered patient telephone to speak w/Meaghan  Patient did not want to speak with Lois Chong, but stated ok to tell her that he is still in ER  When informing Lois Chong of this, Lois Chong states "I might want to come and visit, I would bring Shopritesh Mercedes with me"  Informed Lois Chong that she should call for permission from the patient for visit prior to coming to ER         Yue Lazaro RN  06/24/18 9498

## 2018-06-24 NOTE — ED NOTES
Pt states that he has [employer]  The pt doesn't have his card with him  The pt states that it is a private plan  CW called [employer] at [phone] and their offices were closed and the [employer] at [phone] and the line would go dead when transfer to   CW was unable to verify pt private [employer]  Pt states that he can call his roommate later today to see if they can obtain his insurance card and or information  CW did check EVS and pt is eligable

## 2018-06-24 NOTE — ED NOTES
PC with Lashaun Watters, [address] [facility] [phone], received case number: [id number]  UMBERTO Rojas is to [facility] once patient arrives

## 2018-06-24 NOTE — ED NOTES
Received call from crisis worker Nancy Stiles states that [facility] is reviewing patient and they will call once decision is made       Cornell Casillas RN  06/24/18 0111

## 2018-06-24 NOTE — ED NOTES
Received call from crisis worker Ana María Uribe  States that [facility] is full  States that she will look for a bed until 0800 when Mandi Hope, on call crisis, can be paged       Jasson Black RN  06/24/18 0593

## 2018-06-24 NOTE — ED NOTES
Patient is accepted at [facility]  Patient is accepted by Dr Betty Cotton per [address] is arranged with [facility]  Transportation is scheduled for 7:30 pm  Patient may go to the floor upon arrival         Nurse report is to be called to [phone] prior to patient transfer

## 2018-06-24 NOTE — ED NOTES
Patient is accepted at [facility] [address]  Patient is accepted by Magda LUIS/ Dr Cruz Heck: Azra Julio César [phone]                         Fax [phone]  Tax ID: [id number]  Transportation is arranged with [employer] is scheduled for 23:00 today      Nurse report is to be called  prior to patient transfer at [phone]

## 2018-06-24 NOTE — ED NOTES
Patient states "I slept very well last night, it is the best sleep I have gotten in a while    I feel much better this morning"     Zulay Samuel RN  06/24/18 2401

## 2018-06-24 NOTE — ED NOTES
Assumed care of patient at this time  Patient appears to be sleeping  Respirations equal and unlabored  Will continue to monitor via continuous video monitoring       King Silvia RN  06/24/18 4913

## 2018-06-24 NOTE — ED NOTES
Patient ambulated to bathroom and back to room with out difficulty     Marshall Engle RN  06/24/18 7642

## 2018-06-24 NOTE — ED NOTES
Patient requesting to be made a private patient and states "no visitors"  Registration made aware       Javier Angulo RN  06/24/18 0910       Javier Angulo RN  06/24/18 0615

## 2018-06-24 NOTE — ED NOTES
Breakfast tray delivered to patient  Patient calm/cooperative       Tiana Reynolds RN  06/24/18 0947

## 2018-06-24 NOTE — ED NOTES
Patient ate 100% of breakfast   Received call from staff by parking garage entrance asking if a Kaylan Burton, counselor to patient, could come for a visit  Patient states that is ok with him         Tatiana Simpson RN  06/24/18 2796

## 2018-06-24 NOTE — ED NOTES
Insurance Authorization:   Phone call placed to Krishna number:  [phone]  Spoke to [address] [place] [address]     10 days approved, 6/24/2018 - 7/3/2018  Level of care: IP   Review on 7/3/2018 or when discharged  Authorization: Call with accepting facility, physician, UR (phone & fax) and tax ID required

## 2018-06-24 NOTE — ED NOTES
Patient states having [employer] - purchased privately  Patient does not have card with him but is willing to call a friend to obtain from apartment   Per EVS, Patient is not on file

## 2018-06-24 NOTE — ED NOTES
Patient's counselor arrived, attempted to look up patient's insurance information, but was unable to do so  Counselor spoke with patient's friend regarding insurance information  The friend stated insurance information was given to the police yesterday  Patient consented to have items including wallet searched for insurance card  Card was not located

## 2018-06-25 ENCOUNTER — HOSPITAL ENCOUNTER (INPATIENT)
Facility: HOSPITAL | Age: 63
LOS: 2 days | Discharge: HOME | DRG: 881 | End: 2018-06-27
Attending: PSYCHIATRY & NEUROLOGY | Admitting: PSYCHIATRY & NEUROLOGY
Payer: COMMERCIAL

## 2018-06-25 DIAGNOSIS — E03.9 HYPOTHYROIDISM: ICD-10-CM

## 2018-06-25 DIAGNOSIS — F32.A DEPRESSIVE DISORDER: Primary | ICD-10-CM

## 2018-06-25 DIAGNOSIS — I10 ESSENTIAL HYPERTENSION: ICD-10-CM

## 2018-06-25 DIAGNOSIS — M10.9 GOUT: ICD-10-CM

## 2018-06-25 PROCEDURE — 99222 1ST HOSP IP/OBS MODERATE 55: CPT | Performed by: PSYCHIATRY & NEUROLOGY

## 2018-06-25 PROCEDURE — 99285 EMERGENCY DEPT VISIT HI MDM: CPT

## 2018-06-25 RX ORDER — ALPRAZOLAM 0.5 MG/1
0.5 TABLET ORAL 2 TIMES DAILY PRN
Status: DISCONTINUED | OUTPATIENT
Start: 2018-06-25 | End: 2018-06-27 | Stop reason: HOSPADM

## 2018-06-25 RX ORDER — CITALOPRAM 10 MG/1
10 TABLET ORAL DAILY
Status: DISCONTINUED | OUTPATIENT
Start: 2018-06-25 | End: 2018-06-27 | Stop reason: HOSPADM

## 2018-06-25 RX ORDER — LEVOTHYROXINE SODIUM 0.1 MG/1
100 TABLET ORAL
Status: DISCONTINUED | OUTPATIENT
Start: 2018-06-25 | End: 2018-06-27 | Stop reason: HOSPADM

## 2018-06-25 RX ORDER — TRAMADOL HYDROCHLORIDE 50 MG/1
50 TABLET ORAL EVERY 6 HOURS PRN
Status: DISCONTINUED | OUTPATIENT
Start: 2018-06-25 | End: 2018-06-27 | Stop reason: HOSPADM

## 2018-06-25 RX ORDER — ALPRAZOLAM 0.5 MG/1
0.5 TABLET ORAL 2 TIMES DAILY PRN
COMMUNITY
End: 2018-06-27 | Stop reason: HOSPADM

## 2018-06-25 RX ORDER — IBUPROFEN 400 MG/1
400 TABLET ORAL EVERY 8 HOURS PRN
Status: DISCONTINUED | OUTPATIENT
Start: 2018-06-25 | End: 2018-06-27 | Stop reason: HOSPADM

## 2018-06-25 RX ORDER — LORAZEPAM 1 MG/1
1 TABLET ORAL EVERY 8 HOURS PRN
Status: DISCONTINUED | OUTPATIENT
Start: 2018-06-25 | End: 2018-06-27 | Stop reason: HOSPADM

## 2018-06-25 RX ORDER — ASCORBIC ACID 500 MG
250 TABLET ORAL DAILY
Status: DISCONTINUED | OUTPATIENT
Start: 2018-06-25 | End: 2018-06-27 | Stop reason: HOSPADM

## 2018-06-25 RX ORDER — ACETAMINOPHEN 325 MG/1
650 TABLET ORAL EVERY 6 HOURS PRN
Status: DISCONTINUED | OUTPATIENT
Start: 2018-06-25 | End: 2018-06-27 | Stop reason: HOSPADM

## 2018-06-25 RX ORDER — LISINOPRIL 5 MG/1
5 TABLET ORAL DAILY
Status: DISCONTINUED | OUTPATIENT
Start: 2018-06-25 | End: 2018-06-27 | Stop reason: HOSPADM

## 2018-06-25 RX ORDER — RISPERIDONE 0.5 MG/1
0.5 TABLET, ORALLY DISINTEGRATING ORAL EVERY 8 HOURS PRN
Status: DISCONTINUED | OUTPATIENT
Start: 2018-06-25 | End: 2018-06-27 | Stop reason: HOSPADM

## 2018-06-25 RX ORDER — MELATONIN
1000 DAILY
Status: DISCONTINUED | OUTPATIENT
Start: 2018-06-25 | End: 2018-06-27 | Stop reason: HOSPADM

## 2018-06-25 RX ORDER — ALLOPURINOL 100 MG/1
100 TABLET ORAL 2 TIMES DAILY
Status: DISCONTINUED | OUTPATIENT
Start: 2018-06-25 | End: 2018-06-27 | Stop reason: HOSPADM

## 2018-06-25 RX ORDER — TRAZODONE HYDROCHLORIDE 50 MG/1
50 TABLET ORAL
Status: DISCONTINUED | OUTPATIENT
Start: 2018-06-25 | End: 2018-06-27 | Stop reason: HOSPADM

## 2018-06-25 RX ADMIN — LEVOTHYROXINE SODIUM 100 MCG: 100 TABLET ORAL at 11:40

## 2018-06-25 RX ADMIN — OXYCODONE HYDROCHLORIDE AND ACETAMINOPHEN 250 MG: 500 TABLET ORAL at 11:41

## 2018-06-25 RX ADMIN — ALLOPURINOL 100 MG: 100 TABLET ORAL at 11:40

## 2018-06-25 RX ADMIN — VITAMIN D, TAB 1000IU (100/BT) 1000 UNITS: 25 TAB at 11:42

## 2018-06-25 RX ADMIN — LISINOPRIL 5 MG: 5 TABLET ORAL at 11:42

## 2018-06-25 RX ADMIN — ALLOPURINOL 100 MG: 100 TABLET ORAL at 17:19

## 2018-06-25 RX ADMIN — CITALOPRAM HYDROBROMIDE 10 MG: 10 TABLET ORAL at 12:28

## 2018-06-25 NOTE — LETTER
July 10, 2018     [address]  [id number]    Patient: Joan Cortes   YOB: 1955   Date of Visit: 6/25/2018       Dear Dr Lomas Postal:    Thank you for referring Joan Cortes to me for evaluation  Below are the relevant portions of my H&P  If you have questions, please do not hesitate to call me  I look forward to following Xin Melton along with you  Sincerely,        No name on file  CC: MD Desirae Chacon Rockban  6/25/2018  2:01 PM  Cosign Needed  Chief Complaint: suicide attempt by overdose    Subjective     Patient is a 59year old male presenting to the [facility] in [place] for suicide attempt by overdose  He took 30 tablets of 0 5mg Ativan and 20 tablets of 100mcg Synthroid  The patient was seen after reviewing the chart and discussing the case with caring staff  Today, the patient has no acute concerns  He denies active suicidal or homicidal ideations      Allergies   Allergen Reactions    Hydromorphone      Pt reported reaction to this med     Current Facility-Administered Medications on File Prior to Encounter   Medication    [DISCONTINUED] allopurinol (ZYLOPRIM) tablet 100 mg    [DISCONTINUED] ALPRAZolam Jimmey Maze) tablet 0 5 mg    [DISCONTINUED] levothyroxine tablet 100 mcg    [DISCONTINUED] lisinopril (ZESTRIL) tablet 5 mg     Current Outpatient Prescriptions on File Prior to Encounter   Medication Sig    allopurinol (ZYLOPRIM) 100 mg tablet Take 100 mg by mouth 2 (two) times a day    Ascorbic Acid (VITAMIN C) 100 MG CHEW Chew 100 mg daily    Cholecalciferol (VITAMIN D3) 1000 units CAPS Take 5,000 Units by mouth daily    cyanocobalamin (VITAMIN B-12) 100 mcg tablet Take 1,000 mcg by mouth daily    levothyroxine 100 mcg tablet Take 100 mcg by mouth daily    lisinopril (ZESTRIL) 5 mg tablet Take 5 mg by mouth daily     Active Ambulatory Problems     Diagnosis Date Noted    Allergic rhinitis 2013    Essential hypertension 2012    Excess skin of abdominal wall 2017    Depressive disorder 2012    Gout 2012    Hematuria, microscopic 2016    Hyperlipidemia 2012    Hypothyroidism 2012    Preauricular mass 2016    Prostate nodule 2017    Psoriasis and similar disorder 2013    Routine history and physical examination of adult 2013    Thrombocytopenia ([address] ) 2017    Tiredness 2018    Vitamin D deficiency 2013    Weight loss 2017    Overdose, intentional self-harm, subsequent encounter 2018     Resolved Ambulatory Problems     Diagnosis Date Noted    No Resolved Ambulatory Problems     Past Medical History:   Diagnosis Date    Anxiety     B12 deficiency     Depression     Disease of thyroid gland     Gout     Hypertension     Panic attack     Vitamin D deficiency      Past Surgical History:   Procedure Laterality Date    APPENDECTOMY      COSMETIC SURGERY      HERNIA REPAIR      JOINT REPLACEMENT       Patient further specifies that the "joint replacement" was a left hip repair during childhood  Hernia surgery was a right inguinal hernia repair  Cosmetic surgery was a "body lift" following extensive weight loss    Social History     Patient resides at a private residence with his room mate, Mark Juarez, and Ronaldo's mother  He previously smoked "off and on" for 30 years, but quit 5 years ago  He will have on average 1-2 glasses of wine per day at dinner  He denies illicit drug use      Family History:  Mom, , [de-identified], Breast CA, Heart Disease s/p CABG, hypothyroidism, Type 2 Diabetes Mellitus  Dad, , 76y/o, Alzheimer's dementia  Brother, decreased, 69y/o, alcohol abuse, cardiac arrest  Maternal GM, , 81y/o, stroke  Maternal GF, decreased, 89y/o, no known PMH  Patient unsure of Paternal Grandparents PMH    Review of Systems   Musculoskeletal: Positive for arthralgias  Bilateral knee pain   All other systems reviewed and are negative  Physical Examination:    /79 (BP Location: Right arm)   Pulse 75   Temp (!) 96 6 °F (35 9 °C) (Tympanic)   Resp 18   Ht 6' 2" (1 88 m)   Wt 89 7 kg (197 lb 12 8 oz)   SpO2 97%   BMI 25 40 kg/m²      General Appearance:    Alert, cooperative, no distress, appears stated age   Head:    Normocephalic, without obvious abnormality, atraumatic   Eyes:    PERRL, conjunctiva/corneas clear, EOM's intact, fundi     benign, both eyes        Ears:    Normal TM's and external ear canals, both ears       Throat:   Lips, mucosa, and tongue normal; teeth and gums normal   Neck:   Supple, symmetrical, trachea midline, no adenopathy;        thyroid:  No enlargement/tenderness/nodules; no carotid    bruit or JVD   Back:     Symmetric, no curvature, ROM normal, no CVA tenderness   Lungs:     Clear to auscultation bilaterally, respirations unlabored   Chest wall:    No tenderness or deformity   Heart:    Regular rate and rhythm, S1 and S2 normal, no murmur, rub    or gallop   Abdomen:     Soft, non-tender, bowel sounds active all four quadrants,     no masses, no organomegaly           Extremities:   Extremities normal, atraumatic, no cyanosis or edema       Skin:   Skin color, texture, turgor normal, no rashes or lesions   Lymph nodes:   Cervical, supraclavicular, and axillary nodes normal   Neurologic:   CNII-XII intact  Normal strength, sensation and reflexes       Throughout       Assessment/Plan     Mr Duran Orr is a 59year old male with depression  1  Cardiac with history of HTN  Patient will continue on lisinopril 5mg for this  2  Gout  Patient will continue on allopurinol  3  History of Vitamin D Deficiency  Vitamin D level is pending  Patient is on Vitamin D3 1000U daily  4  History Vitamin B12 Deficiency  Vitamin B12 level is pending  Patient is on oral Vitamin B12 1000mcg daily  5  Hypothyroidism   Patient will continue on levothyroxine 100mcg daily  TSH is pending  6  DJD/OA of knees  Ibuprofen and Tramadol as needed  7  Depression  This will be managed by the psych team     Prognosis: Fair    Discharge Plan: in progress    Advanced Directives: I have discussed in detail the patient the advanced directives  The patient does have a POA and does have a living will  The POA is his friend Starla Paulino  Number for her is not available at this time  Patient will try to obtain this from his cell phone  When inquiring if she would be able to bring this paperwork in, the patient states that it is unlikely  When discussing cardiac and pulmonary resuscitation efforts with the patient, the patient wishes to be a DNR  He states that this is on his living will, which again we do not have in our possession  Patient will remain as a Full Code for now, and I will attempt to talk to his POA about the DNR status once the number is available  I have spent more than 50 minutes gathering data, doing physical examination, and discussing advanced directives with the patient, which was witnessed by caring staff  The patient was discussed with Dr Florence Amin and he is in agreement with the above note

## 2018-06-25 NOTE — NURSING NOTE
Admission note:  Male patient arrived via stretcher from [facility] ED on a 201  Patient calm and cooperative with admission process  Patient stated, "I took to many pills!"  Patient states he has been med compliant with home medications  Patient denies SI, HI, and hallucinations  Patient drinks moderately 2 glasses of wine with dinner  Denies substance abuse  Patient states he lives with a room mate  Patient's mood and affect is labile  Brightens with conversation and interview process  No PRNS given  Patient in bed at this time  Will continue to monitor safety and behaviors every 15 minutes

## 2018-06-25 NOTE — ED NOTES
Spoke with Ciera Gutierres from [facility] about what time the patients ride would be coming and she stated that they are physically in the hospital at this time and should be down to get the patient in a few minutes        Shanelle Soto RN  06/24/18 2518

## 2018-06-25 NOTE — ED NOTES
Patient requesting a snack and some water  Pretzels and water given       Shanelle Soto RN  06/24/18 3625

## 2018-06-25 NOTE — H&P
Psychiatric Evaluation - Behavioral Health     Identification Data:Niko Ram 59 y o  male MRN: [id number]  Unit/Bed#: [facility] 305-01 Encounter: [id number]    Chief Complaint:   Took an overdose-conflict with partner  History of present illness:    Patient is a 59years old  male is admitted because of depressive symptoms and a suicide attempt by overdosing on alprazolam   He reports that he has been in this serious relationship with a man for the past 2 years who has been unfaithful to Orestes Rick at least 3 times and he could no longer take him back and forgive him  Instead, in a moment of frustration, he took the overdose  He denies that this was a premeditated act  He denies any significant neurovegetative signs of depression prior to the argument they had over the weekend  He has been psychotherapy for the past 2 and half years and reports that he has been benefitting from it  His primary care physician has prescribed alprazolam which she takes as needed    Psychiatric Review Of Systems:  Change in sleep: no  Appetite changes: no  Weight changes: no  Change in energy/anergy: no  Change in interest/pleasure/anhedonia: no  Somatic symptoms: no  Anxiety/panic: no  Manic symptoms: no  Guilt feelings:no  Hopeless: no  Self injurious behavior/risky behavior: yes    Historical Information     Past Psychiatric History:   The patient reports that he has had  low grade depression throughout the years but it never interfered with his functioning  No prior suicide attempts  No prior history of inpatient treatment      Substance Abuse History:    History of heavy alcohol use throughout the years  Currently drinks socially  In his 25s tried cocaine and cannabis  He is currently attending AA meetings  Family Psychiatric History:     none  Social History:  Developmental:  The patient reports an uneventful childhood  He has been very close to his family    He took care of both parents until they passed away   Education: some college  Marital history: same sex partner  Living arrangement, social support: significant other  Occupational History: retired  for [employer]  Access to firearms:No    Traumatic History:   Abuse:none is reported  Other Traumatic Events: none    Past Medical History:   Diagnosis Date    Anxiety     B12 deficiency     Depression     Disease of thyroid gland     Gout     Hypertension     Panic attack     Vitamin D deficiency        Medical Review Of Systems:  Pertinent items are noted in HPI  Meds/Allergies   all current active meds have been reviewed  Allergies   Allergen Reactions    Hydromorphone      Pt reported reaction to this med     Objective      Mental Status Evaluation:  Appearance:  {Adequate hygiene and grooming, younger than stated age and Good eye contact   Behavior:  calm, cooperative and friendly   Speech:   Language Normal rate and Normal volume  No overt abnormality   Mood:  depressed   Affect: Thought process appropriate and broad  Goal directed and coherent   Associations: Tightly connected   Thought Content:  Does not verbalize delusional material   Perceptual Disturbances: Denies hallucinations and does not appear to be responding to internal stimuli   Risk Potential: No suicidal or homicidal ideation   Orientation  Oriented x 3   Memory grossly intact   Attention/Concentration attention span and concentration were age appropriate   Fund of knowledge aware of current events, Aware of past history and vocabulary Average   Insight:  Good insight   Judgment: Good judgment   Gait/Station: normal gait/station and normal balance   Motor Activity: No abnormal movement noted         Lab Results: I have personally reviewed pertinent lab results      Imaging Studies:   EKG, Pathology, and Other Studies:   Code Status:Full code    Patient Strengths/Assets: ability for insight, average or above intelligence, capable of independent living, cooperative    Patient Barriers/Limitations: difficulty adapting    Assessment/Plan     Active Problems:    Depressive disorder    Plan:  A trial citalopram is recommended  Risks, benefits and possible side effects of Medications:   Risks, benefits, and possible side effects of medications explained to patient and patient verbalizes understanding

## 2018-06-25 NOTE — ED NOTES
Spoke with Lopez Navarro at [facility]  and informed her that patient will be coming to her facility and should be leaving at [address] [facility] from here with JULIANO Hugo RN  06/24/18 4932

## 2018-06-25 NOTE — EMTALA/ACUTE CARE TRANSFER
[facility]  [address] [address]  Dept: [phone]      EMTALA TRANSFER CONSENT    NAME Betty Peterson                                         1955                              MRN [id number]    I have been informed of my rights regarding examination, treatment, and transfer   by Dr Claudio Viera*    Benefits: Other benefits (Include comment)_______________________ (Evaluate and stabilize)    Risks: Potential for delay in receiving treatment (Inpatient Psychiatry)      Consent for Transfer:  I acknowledge that my medical condition has been evaluated and explained to me by the emergency department physician or other qualified medical person and/or my attending physician, who has recommended that I be transferred to the service of  Accepting Physician: Dr Yariel Taylor at [address] [place] [address] Name, [facility] : [address]  The above potential benefits of such transfer, the potential risks associated with such transfer, and the probable risks of not being transferred have been explained to me, and I fully understand them  The doctor has explained that, in my case, the benefits of transfer outweigh the risks  I agree to be transferred  I authorize the performance of emergency medical procedures and treatments upon me in both transit and upon arrival at the receiving facility  Additionally, I authorize the release of any and all medical records to the receiving facility and request they be transported with me, if possible  I understand that the safest mode of transportation during a medical emergency is an ambulance and that the Hospital advocates the use of this mode of transport  Risks of traveling to the receiving facility by car, including absence of medical control, life sustaining equipment, such as oxygen, and medical personnel has been explained to me and I fully understand them      ([address] [facility])  [  ]  I consent to the stated transfer and to be transported by ambulance/helicopter  [  ]  I consent to the stated transfer, but refuse transportation by ambulance and accept full responsibility for my transportation by car  I understand the risks of non-ambulance transfers and I exonerate the Hospital and its staff from any deterioration in my condition that results from this refusal     X___________________________________________    DATE  18  TIME________  Signature of patient or legally responsible individual signing on patient behalf           RELATIONSHIP TO PATIENT_________________________          Provider Certification    NAME George Linn                                         1955                              MRN [id number]    A medical screening exam was performed on the above named patient  Based on the examination:    Condition Necessitating Transfer The primary encounter diagnosis was Intentional benzodiazepine overdose, initial encounter ([facility] [address] )  Diagnoses of Intentional overdose of drug in tablet form ([facility] [address] ), Suicidal intent, Physical exam, and Routine history and physical examination of adult were also pertinent to this visit      Patient Condition: The patient has been stabilized such that within reasonable medical probability, no material deterioration of the patient condition or the condition of the unborn child(los) is likely to result from the transfer    Reason for Transfer: Level of Care needed not available at this facility    Transfer Requirements: [address] [place] [address]   · Space available and qualified personnel available for treatment as acknowledged by Jaret Purvis [phone]  · Agreed to accept transfer and to provide appropriate medical treatment as acknowledged by       Dr Zina Fernanedz  · Appropriate medical records of the examination and treatment of the patient are provided at the time of transfer   [address] _______  · Transfer will be performed by qualified personnel from [facility]  and appropriate transfer equipment as required, including the use of necessary and appropriate life support measures  Provider Certification: I have examined the patient and explained the following risks and benefits of being transferred/refusing transfer to the patient/family:  General risk, such as traffic hazards, adverse weather conditions, rough terrain or turbulence, possible failure of equipment (including vehicle or aircraft), or consequences of actions of persons outside the control of the transport personnel, The patient is stable for psychiatric transfer because they are medically stable, and is protected from harming him/herself or others during transport, The possibility of a transport vehicle being unavailable      Based on these reasonable risks and benefits to the patient and/or the unborn child(los), and based upon the information available at the time of the patients examination, I certify that the medical benefits reasonably to be expected from the provision of appropriate medical treatments at another medical facility outweigh the increasing risks, if any, to the individuals medical condition, and in the case of labor to the unborn child, from effecting the transfer      X____________________________________________ DATE 06/24/18        TIME_______      ORIGINAL - SEND TO MEDICAL RECORDS   COPY - SEND WITH PATIENT DURING TRANSFER

## 2018-06-25 NOTE — LETTER
July 10, 2018     [address] [place] [address]  [address] Kimani Hoover  [address]    Patient: Ariela Willis   YOB: 1955   Date of Visit: 6/25/2018       Dear Dr Khan Reason:    Thank you for referring Ariela Willis to me for evaluation  Below are the relevant portions of my H&P  If you have questions, please do not hesitate to call me  I look forward to following Jairo Deal along with you  Sincerely,        No name on file  CC: No Recipients  Silvana Causey  6/25/2018  2:01 PM  Cosign Needed  Chief Complaint: suicide attempt by overdose    Subjective     Patient is a 59year old male presenting to the [facility] [address] [place] [address] in [place] for suicide attempt by overdose  He took 30 tablets of 0 5mg Ativan and 20 tablets of 100mcg Synthroid  The patient was seen after reviewing the chart and discussing the case with caring staff  Today, the patient has no acute concerns  He denies active suicidal or homicidal ideations      Allergies   Allergen Reactions    Hydromorphone      Pt reported reaction to this med     Current Facility-Administered Medications on File Prior to Encounter   Medication    [DISCONTINUED] allopurinol (ZYLOPRIM) tablet 100 mg    [DISCONTINUED] ALPRAZolam Beula Phill) tablet 0 5 mg    [DISCONTINUED] levothyroxine tablet 100 mcg    [DISCONTINUED] lisinopril (ZESTRIL) tablet 5 mg     Current Outpatient Prescriptions on File Prior to Encounter   Medication Sig    allopurinol (ZYLOPRIM) 100 mg tablet Take 100 mg by mouth 2 (two) times a day    Ascorbic Acid (VITAMIN C) 100 MG CHEW Chew 100 mg daily    Cholecalciferol (VITAMIN D3) 1000 units CAPS Take 5,000 Units by mouth daily    cyanocobalamin (VITAMIN B-12) 100 mcg tablet Take 1,000 mcg by mouth daily    levothyroxine 100 mcg tablet Take 100 mcg by mouth daily    lisinopril (ZESTRIL) 5 mg tablet Take 5 mg by mouth daily     Active Ambulatory Problems     Diagnosis Date Noted    Allergic rhinitis 2013    Essential hypertension 2012    Excess skin of abdominal wall 2017    Depressive disorder 2012    Gout 2012    Hematuria, microscopic 2016    Hyperlipidemia 2012    Hypothyroidism 2012    Preauricular mass 2016    Prostate nodule 2017    Psoriasis and similar disorder 2013    Routine history and physical examination of adult 2013    Thrombocytopenia ([address] ) 2017    Tiredness 2018    Vitamin D deficiency 2013    Weight loss 2017    Overdose, intentional self-harm, subsequent encounter 2018     Resolved Ambulatory Problems     Diagnosis Date Noted    No Resolved Ambulatory Problems     Past Medical History:   Diagnosis Date    Anxiety     B12 deficiency     Depression     Disease of thyroid gland     Gout     Hypertension     Panic attack     Vitamin D deficiency      Past Surgical History:   Procedure Laterality Date    APPENDECTOMY      COSMETIC SURGERY      HERNIA REPAIR      JOINT REPLACEMENT       Patient further specifies that the "joint replacement" was a left hip repair during childhood  Hernia surgery was a right inguinal hernia repair  Cosmetic surgery was a "body lift" following extensive weight loss    Social History     Patient resides at a private residence with his room mate, Preeti Fernandez, and Ronaldo's mother  He previously smoked "off and on" for 30 years, but quit 5 years ago  He will have on average 1-2 glasses of wine per day at dinner  He denies illicit drug use      Family History:  Mom, , [de-identified], Breast CA, Heart Disease s/p CABG, hypothyroidism, Type 2 Diabetes Mellitus  Dad, , 76y/o, Alzheimer's dementia  Brother, decreased, 71y/o, alcohol abuse, cardiac arrest  Maternal GM, , 81y/o, stroke  Maternal GF, decreased, 89y/o, no known PMH  Patient unsure of Paternal Grandparents PMH    Review of Systems   Musculoskeletal: Positive for arthralgias  Bilateral knee pain   All other systems reviewed and are negative  Physical Examination:    /79 (BP Location: Right arm)   Pulse 75   Temp (!) 96 6 °F (35 9 °C) (Tympanic)   Resp 18   Ht 6' 2" (1 88 m)   Wt 89 7 kg (197 lb 12 8 oz)   SpO2 97%   BMI 25 40 kg/m²      General Appearance:    Alert, cooperative, no distress, appears stated age   Head:    Normocephalic, without obvious abnormality, atraumatic   Eyes:    PERRL, conjunctiva/corneas clear, EOM's intact, fundi     benign, both eyes        Ears:    Normal TM's and external ear canals, both ears       Throat:   Lips, mucosa, and tongue normal; teeth and gums normal   Neck:   Supple, symmetrical, trachea midline, no adenopathy;        thyroid:  No enlargement/tenderness/nodules; no carotid    bruit or JVD   Back:     Symmetric, no curvature, ROM normal, no CVA tenderness   Lungs:     Clear to auscultation bilaterally, respirations unlabored   Chest wall:    No tenderness or deformity   Heart:    Regular rate and rhythm, S1 and S2 normal, no murmur, rub    or gallop   Abdomen:     Soft, non-tender, bowel sounds active all four quadrants,     no masses, no organomegaly           Extremities:   Extremities normal, atraumatic, no cyanosis or edema       Skin:   Skin color, texture, turgor normal, no rashes or lesions   Lymph nodes:   Cervical, supraclavicular, and axillary nodes normal   Neurologic:   CNII-XII intact  Normal strength, sensation and reflexes       Throughout       Assessment/Plan     Mr Valeria Pickett is a 59year old male with depression  1  Cardiac with history of HTN  Patient will continue on lisinopril 5mg for this  2  Gout  Patient will continue on allopurinol  3  History of Vitamin D Deficiency  Vitamin D level is pending  Patient is on Vitamin D3 1000U daily  4  History Vitamin B12 Deficiency  Vitamin B12 level is pending  Patient is on oral Vitamin B12 1000mcg daily  5  Hypothyroidism   Patient will continue on levothyroxine 100mcg daily  TSH is pending  6  DJD/OA of knees  Ibuprofen and Tramadol as needed  7  Depression  This will be managed by the psych team     Prognosis: Fair    Discharge Plan: in progress    Advanced Directives: I have discussed in detail the patient the advanced directives  The patient does have a POA and does have a living will  The POA is his friend Kaitlynn Rivera  Number for her is not available at this time  Patient will try to obtain this from his cell phone  When inquiring if she would be able to bring this paperwork in, the patient states that it is unlikely  When discussing cardiac and pulmonary resuscitation efforts with the patient, the patient wishes to be a DNR  He states that this is on his living will, which again we do not have in our possession  Patient will remain as a Full Code for now, and I will attempt to talk to his POA about the DNR status once the number is available  I have spent more than 50 minutes gathering data, doing physical examination, and discussing advanced directives with the patient, which was witnessed by caring staff  The patient was discussed with Dr Lloyd Grayson and he is in agreement with the above note

## 2018-06-26 PROCEDURE — 99232 SBSQ HOSP IP/OBS MODERATE 35: CPT | Performed by: PSYCHIATRY & NEUROLOGY

## 2018-06-26 RX ADMIN — VITAMIN D, TAB 1000IU (100/BT) 1000 UNITS: 25 TAB at 08:37

## 2018-06-26 RX ADMIN — CYANOCOBALAMIN TAB 500 MCG 1000 MCG: 500 TAB at 17:49

## 2018-06-26 RX ADMIN — ALLOPURINOL 100 MG: 100 TABLET ORAL at 08:37

## 2018-06-26 RX ADMIN — LEVOTHYROXINE SODIUM 100 MCG: 100 TABLET ORAL at 06:25

## 2018-06-26 RX ADMIN — CITALOPRAM HYDROBROMIDE 10 MG: 10 TABLET ORAL at 08:37

## 2018-06-26 RX ADMIN — LORAZEPAM 1 MG: 1 TABLET ORAL at 22:02

## 2018-06-26 RX ADMIN — LISINOPRIL 5 MG: 5 TABLET ORAL at 08:38

## 2018-06-26 RX ADMIN — OXYCODONE HYDROCHLORIDE AND ACETAMINOPHEN 250 MG: 500 TABLET ORAL at 08:37

## 2018-06-26 RX ADMIN — ALLOPURINOL 100 MG: 100 TABLET ORAL at 17:49

## 2018-06-26 NOTE — NURSING NOTE
Patient met resting quietly in room after breakfast  The patient noted to be pleasant and bright upon approach  The patient states he is "stir crazy" due to patient used to being more active when at home  The patient denies depression, anxiety, si and hi  The patient states "I shouldn't have done what I did, it was really stupid of me " The patient states that he is ready to go home  Patient noted to be compliant with meals and medications  Patient denies complaints of pain  Falling star protocol maintained  Q 15 minute checks continue to monitor for behaviors and safety

## 2018-06-26 NOTE — DISCHARGE INSTR - OTHER ORDERS
Triggers you have identified during your hospital that led to suicidal ideation due to relationship stressors and a recent break up with your significant other  Coping skills you have identified during your hospitalization include reading and talking with your friends    If you are unable to deal with your distressed mood alone please speak with one of your roommates or your therapist, Antwan    If that is not effective and you continue to have suicidal ideation, please call [address] [place] [address] at [phone], dial [phone] or go to the nearest emergency center

## 2018-06-26 NOTE — PROGRESS NOTES
Progress Note - Behavioral Health   Jose Waters 59 y o  male MRN: [id number]  Unit/Bed#: [facility] 305-01 Encounter: [id number]    The patient was seen for continuing care and reviewed with treatment team Had a restless night  Normal appetite  Has decided to break up with his partner and get on with his life  Positive about his future  He has tolerated citalopram without difficulty    Mental Status Evaluation:  Appearance:  Adequate hygiene and grooming and Good eye contact   Behavior:  calm, cooperative and friendly   Mood:  improving   Affect: appropriate and broad   Speech: Normal rate and Normal volume   Thought Process:  Goal directed and coherent   Thought Content:  Does not verbalize delusional material   Perceptual Disturbances: Denies hallucinations and does not appear to be responding to internal stimuli   Risk Potential: No suicidal or homicidal ideation   Attention/Concentration attention span and concentration were age appropriate   Orientation:   No cognitive deficits   Gait/Station: normal gait/station and normal balance   Motor Activity: No abnormal movement noted     Progress Toward Goals: no change    Assessment/Plan    Principal Problem:    Depressive disorder      Recommended Treatment: Continue with pharmacotherapy, group therapy, milieu therapy and occupational therapy    The patient will be maintained on the following medications:    Current Facility-Administered Medications:  acetaminophen 650 mg Oral Q6H PRN Ricky Zambrano MD   allopurinol 100 mg Oral BID Vonnie Rodrigez   ALPRAZolam 0 5 mg Oral BID PRN Ricky Zambrano MD   ascorbic acid 250 mg Oral Daily Rosas Rodrigez   cholecalciferol 1,000 Units Oral Daily Vonnie Rodrigez   citalopram 10 mg Oral Daily Ricky Zambrano MD   cyanocobalamin 1,000 mcg Oral Daily Vonnie Rodrigez   ibuprofen 400 mg Oral Q8H PRN Ricky Zambrano MD   levothyroxine 100 mcg Oral Early Morning Rosas Rodrigez   lisinopril 5 mg Oral Daily Zoe Heath LORazepam 1 mg Oral Q8H PRN Ayden Graves MD   risperiDONE 0 5 mg Oral Q8H PRN Ayden Graves MD   traMADol 50 mg Oral Q6H PRN Ayden Graves MD   traZODone 50 mg Oral HS PRN Ayden Graves MD       Risks, benefits and possible side effects of Medications:   Risks, benefits, and possible side effects of medications explained to patient and patient verbalizes understanding        If he remains stable, he will be discharged tomorrow morning

## 2018-06-26 NOTE — PLAN OF CARE
Problem: DISCHARGE PLANNING  Goal: Discharge to home or other facility with appropriate resources  INTERVENTIONS:  - Identify barriers to discharge w/patient and caregiver  - Arrange for needed discharge resources and transportation as appropriate  - Coordinate discharge planning if the patient needs post-hospital services based on physician/advanced practitioner order or complex needs related to functional status, cognitive ability, or social support system  Outcome: Progressing  Discharge planned for Weds, 6/27/18

## 2018-06-26 NOTE — PROGRESS NOTES
Patient presents with appropriate affect, brightening upon approach  Patient had family members visit this evening  Patient denies SI at this time, spending time both independently and with peers  Will continue to monitor q 15 minutes; falling star protocol maintained

## 2018-06-26 NOTE — DISCHARGE INSTR - APPOINTMENTS
The treatment team recommends ongoing medication management upon discharge  A referral has been made on your behalf to [facility], [address], [address] [facility], [place], [place]  Phone: [phone]  Your intake appointment is scheduled for 7/16/18 at 12pm with Paloma Back  Please plan to arrive 15 minutes prior to your appointment to complete paperwork, bring your insurance card(s) and photo ID

## 2018-06-26 NOTE — PROGRESS NOTES
Tatianna Reyes  QDQ:[id number]    FXC:[id number]  Adm Date: 6/25/2018 0119  1:19 AM   ATT PHY: [email or web address]         Subjective     The patient was seen after reviewing the chart and discussing the case with caring staff  Today during our encounter, the patient reported no acute concerns  Labs are still pending on the patient  The POA's number also has yet to be recorded  Of note, patient is scheduled for discharge tomorrow  Patient is medically cleared for discharge  Medication list has been reconciled  Scripts will be filled out  Objective     Vitals:    06/26/18 0646   BP: 126/82   Pulse: 72   Resp: 18   Temp: (!) 96 6 °F (35 9 °C)   SpO2: 97%       General Appearance: Awake and Alert  No acute distress  HEENT: Normocephalic, atraumatic  PERRLA, EOMI, MMM  Heart: RRR, no murmurs  Normal S1 and S2   Lungs: CTA bilaterally with fair air entry  Assessment     Mr Justin Monahan is a 59year old male with depression      1  Cardiac with history of HTN  lisinopril 5mg  2  Gout  Patient will continue on allopurinol  3  History of Vitamin D Deficiency  Vitamin D level is pending  Patient is on Vitamin D3 1000U daily  4  History Vitamin B12 Deficiency  Vitamin B12 level is pending  Patient is on oral Vitamin B12 1000mcg daily  5  Hypothyroidism  Patient will continue on levothyroxine 100mcg daily  TSH is pending  6  DJD/OA of knees  Ibuprofen and Tramadol as needed  7  Depression  This will be managed by the psych team     The patient was discussed with Dr Liz Dillard and he is in agreement with the above note

## 2018-06-26 NOTE — SOCIAL WORK
kayy placed phone call to [facility] with patient in sw office to obtain pt member ID - Gene Rosa provided Member ID as [id number]    kayy placed phone call to [facility] to schedule follow up appointment;  Spoke with Yaima Galloway;   Cherise provided sw with [facility] # as [id number];  kayy provided [facility] ID as well     Medication management appointment scheduled for intake on 7/16 at 12pm with Rosario Ovalle at [address] [place] [address]  phone: [phone], fax: [phone]

## 2018-06-26 NOTE — NURSING NOTE
Pt has been up x2 this shift to check the time, affect brightens when approached  Pt denies SI at this time to this writer  Will continue to observe and monitor for changes  Safety maintained per q 15 min checks

## 2018-06-26 NOTE — PLAN OF CARE
Patient attended groups this AM, participation was appropriate and his mood brightened during group  Patient came to RT to thank for AM group that he truly enjoyed it  Patient declined PM group to continue reading in his room

## 2018-06-27 VITALS
OXYGEN SATURATION: 96 % | DIASTOLIC BLOOD PRESSURE: 77 MMHG | HEART RATE: 82 BPM | HEIGHT: 74 IN | SYSTOLIC BLOOD PRESSURE: 135 MMHG | RESPIRATION RATE: 18 BRPM | BODY MASS INDEX: 25.38 KG/M2 | WEIGHT: 197.8 LBS | TEMPERATURE: 96.5 F

## 2018-06-27 PROCEDURE — 99239 HOSP IP/OBS DSCHRG MGMT >30: CPT | Performed by: PSYCHIATRY & NEUROLOGY

## 2018-06-27 RX ORDER — ALLOPURINOL 100 MG/1
100 TABLET ORAL 2 TIMES DAILY
Qty: 60 TABLET | Refills: 1 | Status: SHIPPED | OUTPATIENT
Start: 2018-06-27

## 2018-06-27 RX ORDER — LEVOTHYROXINE SODIUM 0.1 MG/1
100 TABLET ORAL
Qty: 30 TABLET | Refills: 1 | Status: SHIPPED | OUTPATIENT
Start: 2018-06-27 | End: 2019-09-17

## 2018-06-27 RX ORDER — ALPRAZOLAM 0.5 MG/1
0.5 TABLET ORAL 2 TIMES DAILY PRN
Qty: 30 TABLET | Refills: 0 | Status: SHIPPED | OUTPATIENT
Start: 2018-06-27 | End: 2019-10-28

## 2018-06-27 RX ORDER — CITALOPRAM 10 MG/1
10 TABLET ORAL DAILY
Qty: 30 TABLET | Refills: 0 | Status: ON HOLD | OUTPATIENT
Start: 2018-06-28 | End: 2019-12-05 | Stop reason: ALTCHOICE

## 2018-06-27 RX ORDER — LISINOPRIL 5 MG/1
5 TABLET ORAL DAILY
Qty: 30 TABLET | Refills: 1 | Status: SHIPPED | OUTPATIENT
Start: 2018-06-27

## 2018-06-27 RX ADMIN — CYANOCOBALAMIN TAB 500 MCG 1000 MCG: 500 TAB at 08:18

## 2018-06-27 RX ADMIN — LISINOPRIL 5 MG: 5 TABLET ORAL at 08:18

## 2018-06-27 RX ADMIN — VITAMIN D, TAB 1000IU (100/BT) 1000 UNITS: 25 TAB at 08:18

## 2018-06-27 RX ADMIN — LEVOTHYROXINE SODIUM 100 MCG: 100 TABLET ORAL at 05:48

## 2018-06-27 RX ADMIN — ALLOPURINOL 100 MG: 100 TABLET ORAL at 08:18

## 2018-06-27 RX ADMIN — OXYCODONE HYDROCHLORIDE AND ACETAMINOPHEN 250 MG: 500 TABLET ORAL at 08:18

## 2018-06-27 RX ADMIN — CITALOPRAM HYDROBROMIDE 10 MG: 10 TABLET ORAL at 08:18

## 2018-06-27 NOTE — DISCHARGE SUMMARY
Discharge Summary - [facility] 59 y o  male MRN: [id number]  Unit/Bed#: [address]- Encounter: [id number]     Admission Date: 6/25/2018         Discharge Date:  June 26, 2018  Attending Psychiatrist:  Camila Brewster MD  Reason for Admission/HPI:     History of Present Illness     Patient is a 59 y o  male with a history of depression and anxiety who was admitted to the inpatient psychiatric unit on a voluntary 201 commitment basis due to depression and anxiety  Symptoms prior to admission included feeling depressed, hopelessness, helplessness, poor concentration, poor appetite, difficulty sleeping, anxiety attacks and difficulty attending to activities of daily living  Onset of symptoms was abrupt starting several days ago with gradually worsening course since that time  Stressors preceding admission included break up with domestic partner  Historical Information     Past Psychiatric History:     Past Inpatient Psychiatric Treatment:  No history of past inpatient psychiatric admissions  Past Outpatient Psychiatric Treatment: No history of past outpatient psychiatric treatment  Has never seen a psychiatrist prior to admission  Past Suicide attempts: no  Past Violent behavior: no  Past Psychiatric medication trials:  As needed Alprazolam    Substance Abuse History:    Social History     Tobacco History     Smoking Status  Former Smoker Quit date  6/25/2013 Smoking Frequency  For 30 years    Smokeless Tobacco Use  Never Used          Alcohol History     Alcohol Use Status  Yes Drinks/Week  2 Glasses of wine per week Amount  1 2 oz alcohol/wk Comment  Wine with dinner everyday  Drug Use     Drug Use Status  No          Sexual Activity     Sexually Active  Not Asked          Activities of Daily Living    Not Asked                     Hospital Course: The patient was admitted to the inpatient psychiatric unit and started on every 15 minutes precautions   During the hospitalization the patient was attending individual therapy, group therapy, milieu therapy and occupational therapy  Psychiatric medications were titrated over the hospital stay  To address depressive symptoms and anxiety symptoms the patient was started on antidepressant Celexa  Medication doses were titrated during the hospital course  Prior to beginning of treatment medications risks and benefits and possible side effects including risk of suicidality related to treatment with antidepressants were reviewed with the patient  The patient verbalized understanding and agreement for treatment  Patient's symptoms improved gradually over the hospital course  Psychotherapy sessions focused on his future plans and the nature of relationship she was pursuing  At the end of treatment the patient was doing well  Mood was stable at the time of discharge  The patient denied suicidal ideation, intent or plan at the time of discharge and denied homicidal ideation, intent or plan at the time of discharge  There was no overt psychosis at the time of discharge  Sleep and appetite were improved  The patient was tolerating medications and was not reporting any significant side effects at the time of discharge  Since the patient was doing well at the end of the hospitalization, treatment team felt that the patient could be safely discharged to outpatient care  Since he was doing well at the end of the hospitalization, treatment team felt that he could be safely discharged to outpatient care  The outpatient follow up with primary care physician was arranged by the unit  upon discharge      Mental Status at Time of Discharge:     Appearance:  age appropriate, casually dressed, looks stated age   Behavior:  pleasant, cooperative, calm   Speech:  normal rate, normal volume   Mood:  improved   Affect:  normal range and intensity   Thought Process:  organized   Thought Content:  normal   Perceptual Disturbances: none   Risk Potential: Suicidal ideation - None at present   Sensorium:  person, place and time/date   Cognition:  recent and remote memory grossly intact   Consciousness:  alert    Attention: attention span and concentration are age appropriate   Insight:  age appropriate   Judgment: age appropriate   Gait/Station: normal gait/station and normal balance   Motor Activity: no abnormal movements     Admission Diagnosis:  Principal Problem:    Depressive disorder      Discharge Diagnosis:     Principal Problem:    Depressive disorder  Resolved Problems:    * No resolved hospital problems  *      Lab results:    No visits with results within 1 Day(s) from this visit     Latest known visit with results is:   Admission on 06/23/2018, Discharged on 06/25/2018   Component Date Value    Sodium 06/23/2018 142     Potassium 06/23/2018 4 8     Chloride 06/23/2018 103     CO2 06/23/2018 31     Anion Gap 06/23/2018 8     BUN 06/23/2018 17     Creatinine 06/23/2018 0 99     Glucose 06/23/2018 67     Calcium 06/23/2018 9 1     AST 06/23/2018 27     ALT 06/23/2018 29     Alkaline Phosphatase 06/23/2018 59     Total Protein 06/23/2018 6 5     Albumin 06/23/2018 3 6     Total Bilirubin 06/23/2018 0 83     eGFR 06/23/2018 81     WBC 06/23/2018 8 62     RBC 06/23/2018 4 93     Hemoglobin 06/23/2018 16 1     Hematocrit 06/23/2018 47 8     MCV 06/23/2018 97     MCH 06/23/2018 32 7     MCHC 06/23/2018 33 7     RDW 06/23/2018 13 5     MPV 06/23/2018 11 0     Platelets 42/59/8942 89*    nRBC 06/23/2018 0     Neutrophils Relative 06/23/2018 70     Lymphocytes Relative 06/23/2018 18     Monocytes Relative 06/23/2018 9     Eosinophils Relative 06/23/2018 2     Basophils Relative 06/23/2018 1     Neutrophils Absolute 06/23/2018 6 07     Lymphocytes Absolute 06/23/2018 1 55     Monocytes Absolute 06/23/2018 0 76     Eosinophils Absolute 06/23/2018 0 18     Basophils Absolute 06/23/2018 0 06     TSH 3RD GENERATON 06/23/2018 0 445     Amph/Meth UR 06/23/2018 Negative     Barbiturate Ur 06/23/2018 Negative     Benzodiazepine Urine 06/23/2018 Positive*    Cocaine Urine 06/23/2018 Negative     Methadone Urine 06/23/2018 Negative     Opiate Urine 06/23/2018 Negative     PCP Ur 06/23/2018 Negative     THC Urine 06/23/2018 Negative     Ethanol Lvl 01/38/0262 <3     Salicylate Lvl 04/20/7368 <3*    Acetaminophen Level 06/23/2018 <2 0*    Color, UA 06/24/2018 yellow     Sodium 06/24/2018 139     Potassium 06/24/2018 4 2     Chloride 06/24/2018 102     CO2 06/24/2018 29     Anion Gap 06/24/2018 8     BUN 06/24/2018 18     Creatinine 06/24/2018 1 18     Glucose 06/24/2018 122     Calcium 06/24/2018 9 3     AST 06/24/2018 30     ALT 06/24/2018 30     Alkaline Phosphatase 06/24/2018 64     Total Protein 06/24/2018 6 8     Albumin 06/24/2018 3 6     Total Bilirubin 06/24/2018 0 82     eGFR 06/24/2018 66     WBC 06/24/2018 7 22     RBC 06/24/2018 4 96     Hemoglobin 06/24/2018 16 2     Hematocrit 06/24/2018 47 9     MCV 06/24/2018 97     MCH 06/24/2018 32 7     MCHC 06/24/2018 33 8     RDW 06/24/2018 13 6     MPV 06/24/2018 11 2     Platelets 94/43/9880 100*    nRBC 06/24/2018 0     Neutrophils Relative 06/24/2018 63     Lymphocytes Relative 06/24/2018 25     Monocytes Relative 06/24/2018 8     Eosinophils Relative 06/24/2018 4     Basophils Relative 06/24/2018 1     Neutrophils Absolute 06/24/2018 4 52     Lymphocytes Absolute 06/24/2018 1 82     Monocytes Absolute 06/24/2018 0 57     Eosinophils Absolute 06/24/2018 0 25     Basophils Absolute 06/24/2018 0 06     TSH 3RD GENERATON 06/24/2018 0 219*    Color, UA 06/24/2018 Yellow     Clarity, UA 06/24/2018 Clear     pH, UA 06/24/2018 5 0     Leukocytes, UA 06/24/2018 Negative     Nitrite, UA 06/24/2018 Negative     Protein, UA 06/24/2018 100 (2+)*    Glucose, UA 06/24/2018 Negative     Ketones, UA 06/24/2018 Negative     Urobilinogen, UA 06/24/2018 0 2     Bilirubin, UA 06/24/2018 Negative     Blood, UA 06/24/2018 Negative     Specific Gravity, UA 06/24/2018 1 020     RBC, UA 06/24/2018 None Seen     WBC, UA 06/24/2018 None Seen     Epithelial Cells 06/24/2018 Occasional     Bacteria, UA 06/24/2018 Occasional     MUCOUS THREADS 06/24/2018 Moderate*    Ventricular Rate 06/23/2018 76     Atrial Rate 06/23/2018 76     WA Interval 06/23/2018 188     QRSD Interval 06/23/2018 108     QT Interval 06/23/2018 380     QTC Interval 06/23/2018 427     P Axis 06/23/2018 75     QRS Axis 06/23/2018 22     T Wave Axis 06/23/2018 75        Discharge Medications:    See after visit summary for reconciled discharge medications provided to patient and family  Discharge instructions/Information to patient and family:     See after visit summary for information provided to patient and family  Provisions for Follow-Up Care:    See after visit summary for information related to follow-up care and any pertinent home health orders  Discharge Statement     I spent 30 minutes discharging the patient  This time was spent on the day of discharge  I had direct contact with the patient on the day of discharge  Additional documentation is required if more than 30 minutes were spent on discharge:    I reviewed with Brigida Field importance of compliance with medications and outpatient treatment after discharge  I discussed the medication regimen and possible side effects of the medications with Brigida Field prior to discharge  At the time of discharge he was tolerating psychiatric medications  I discussed outpatient follow up with Brigida Field

## 2018-06-27 NOTE — PLAN OF CARE
DISCHARGE PLANNING     Discharge to home or other facility with appropriate resources Progressing        Ineffective Coping     Participates in unit activities Progressing        Risk for Self Injury/Neglect     Treatment Goal: Remain safe during length of stay, learn and adopt new coping skills, and be free of self-injurious ideation, impulses and acts at the time of discharge Progressing     Verbalize thoughts and feelings Progressing     Refrain from harming self Progressing     Attend and participate in unit activities, including therapeutic, recreational, and educational groups Progressing     Recognize maladaptive responses and adopt new coping mechanisms Progressing

## 2018-06-27 NOTE — PROGRESS NOTES
Patient requesting anti-anxiety medication at [facility], stating he is '98% ready and excited to go home, but 2% anxious and nervous"  Patient utilizing open communication and was responsive to anxiolytic education  Ativan 1 mg provided  Will continue to monitor q 15 minutes; falling star protocol maintained

## 2018-06-27 NOTE — PLAN OF CARE
DISCHARGE PLANNING     Discharge to home or other facility with appropriate resources Adequate for Discharge        Ineffective Coping     Participates in unit activities Adequate for Discharge        Risk for Self Injury/Neglect     Treatment Goal: Remain safe during length of stay, learn and adopt new coping skills, and be free of self-injurious ideation, impulses and acts at the time of discharge Adequate for Discharge     Verbalize thoughts and feelings Adequate for Discharge     Refrain from harming self Adequate for Discharge     Attend and participate in unit activities, including therapeutic, recreational, and educational groups Adequate for Discharge     Recognize maladaptive responses and adopt new coping mechanisms Adequate for Discharge

## 2018-06-27 NOTE — NURSING NOTE
Patient noted to be sitting quietly reading in room upon initial assessment  Patient denies complaints of pain  Patient denies si, hi, anxiety and depression  Patient states that he feels ready to be discharged to home  Patient noted to be compliant with meals and medications  Q 15 minute checks continue to monitor for behaviors and safety  Will continue to monitor

## 2018-06-27 NOTE — DISCHARGE INSTRUCTIONS
Citalopram (By mouth)   Citalopram ([id number])  Treats depression  Brand Name(s): CeleXA   There may be other brand names for this medicine  When This Medicine Should Not Be Used: This medicine is not right for everyone  Do not use it if you had an allergic reaction to citalopram   How to Use This Medicine:   Liquid, Tablet  · Take this medicine as directed  You may need to take it for a month or more before you feel better  Your dose may need to be changed to find out what works best for you  · Oral liquid: Measure the oral liquid medicine with a marked measuring spoon, oral syringe, or medicine cup  · This medicine should come with a Medication Guide  Ask your pharmacist for a copy if you do not have one  · Missed dose: Take a dose as soon as you remember  If it is almost time for your next dose, wait until then and take a regular dose  Do not take extra medicine to make up for a missed dose  · Store the medicine in a closed container at room temperature, away from heat, moisture, and direct light  Drugs and Foods to Avoid:   Ask your doctor or pharmacist before using any other medicine, including over-the-counter medicines, vitamins, and herbal products  · Do not use this medicine together with pimozide  Do not use this medicine and an MAO inhibitor (MAOI) within 14 days of each other  · Some medicines can affect how citalopram works   Tell your doctor if you are using the following:   ¨ Buspirone, carbamazepine, chlorpromazine, cimetidine, fentanyl, gatifloxacin, levomethadyl, lithium, methadone, moxifloxacin, omeprazole, pentamidine, Prashant's wort, thioridazine, tramadol, or tryptophan supplements  ¨ Amphetamines  ¨ Blood thinner (including warfarin)  ¨ Diuretic (water pill)  ¨ Medicine for heart rhythm problems (including amiodarone, procainamide, quinidine, sotalol)  ¨ NSAID pain or arthritis medicine (including aspirin, celecoxib, diclofenac, ibuprofen, naproxen)  ¨ Triptan medicine to treat migraine headaches (including sumatriptan)  · Do not drink alcohol while you are using this medicine  Warnings While Using This Medicine:   · Tell your doctor if you are pregnant or breastfeeding, or if you have kidney disease, liver disease, bleeding problems, glaucoma, heart problems, or a seizure disorder  Tell your doctor if you or anyone in your family has a bipolar disorder, heart rhythm problem (such as QT prolongation or a slow heartbeat), or a recent heart attack  · This medicine may cause the following problems:   ¨ Heart rhythm problems  ¨ Serotonin syndrome (more likely when used with certain other medicines)  ¨ Increased risk of bleeding problems  ¨ Low sodium levels  ¨ Slow growth in children  · This medicine can increase thoughts of suicide  Tell your doctor right away if you start to feel depressed and have thoughts about hurting yourself  · This medicine may make you dizzy or drowsy  Do not drive or do anything that could be dangerous until you know how this medicine affects you  · Do not stop using this medicine suddenly  Your doctor will need to slowly decrease your dose before you stop it completely  · Your doctor will check your progress and the effects of this medicine at regular visits  Keep all appointments  · Keep all medicine out of the reach of children  Never share your medicine with anyone    Possible Side Effects While Using This Medicine:   Call your doctor right away if you notice any of these side effects:  · Allergic reaction: Itching or hives, swelling in your face or hands, swelling or tingling in your mouth or throat, chest tightness, trouble breathing  · Anxiety, restlessness, fever, sweating, muscle spasms, nausea, vomiting, diarrhea, seeing or hearing things that are not there  · Chest pain, trouble breathing  · Confusion, weakness, and muscle twitching  · Fast, pounding, or uneven heartbeat  · Feeling more excited or energetic than usual, trouble sleeping, racing thoughts  · Eye pain, vision changes, seeing halos around lights  · Lightheadedness, dizziness, fainting  · Painful, prolonged erection of your penis  · Thoughts of hurting yourself or others, unusual behavior  · Unusual bleeding or bruising  If you notice these less serious side effects, talk with your doctor:   · Decreased appetite, weight loss (in children)  · Dry mouth  · Sexual problems  · Sleepiness or drowsiness  If you notice other side effects that you think are caused by this medicine, tell your doctor  Call your doctor for medical advice about side effects  You may report side effects to FDA at 4-638-FDA-0958  © 2017 [employer]  Information is for End User's use only and may not be sold, redistributed or otherwise used for commercial purposes  The above information is an  only  It is not intended as medical advice for individual conditions or treatments  Talk to your doctor, nurse or pharmacist before following any medical regimen to see if it is safe and effective for you  Alprazolam (By mouth)   Alprazolam ([id number])  Treats anxiety and panic disorder  Brand Name(s): ALPRAZolam Intensol, Niravam, Xanax, Xanax XR   There may be other brand names for this medicine  When This Medicine Should Not Be Used: This medicine is not right for everyone  Do not use it if you had an allergic reaction to alprazolam or similar medicines, or if you are pregnant or breastfeeding, or you have narrow-angle glaucoma  How to Use This Medicine:   Liquid, Tablet, Dissolving Tablet, Long Acting Tablet  · Take your medicine as directed  Your dose may need to be changed several times to find what works best for you  · Disintegrating tablet: Dry your hands before you handle the tablet  Place the tablet on your tongue and let it dissolve  · Extended-release tablet: Swallow the extended-release tablet whole  Do not crush, break, or chew it    · Oral liquid: Measure the oral liquid medicine with a marked measuring spoon, oral syringe, or medicine cup  · This medicine should come with a Medication Guide  Ask your pharmacist for a copy if you do not have one  · Missed dose: Take a dose as soon as you remember  If it is almost time for your next dose, wait until then and take a regular dose  Do not take extra medicine to make up for a missed dose  · Store the medicine in a closed container at room temperature, away from heat, moisture, and direct light  Protect the orally disintegrating tablets from moisture  Throw away any cotton that was in the bottle and reseal it tightly after each use  Drugs and Foods to Avoid:   Ask your doctor or pharmacist before using any other medicine, including over-the-counter medicines, vitamins, and herbal products  · Do not use this medicine if you are also using ketoconazole or itraconazole  · Some foods and medicines can affect how alprazolam works  Tell your doctor if you are using any of the following:  ¨ Amiodarone, carbamazepine, clarithromycin, cimetidine, cyclosporine, desipramine, diltiazem, ergotamine, erythromycin, fluconazole, fluoxetine, fluvoxamine, imipramine, isoniazid, nefazodone, nicardipine, nifedipine, paroxetine, propoxyphene, sertraline, or theophylline  ¨ Birth control pills  ¨ Seizure medicine  · Tell your doctor if you use anything else that makes you sleepy  Some examples are allergy medicine, narcotic pain medicine, and alcohol  · Do not drink alcohol while you are using this medicine  · Do not eat grapefruit or drink grapefruit juice while you are using this medicine  Warnings While Using This Medicine:   · It is not safe to take this medicine during pregnancy  It could harm an unborn baby  Tell your doctor right away if you become pregnant    · Tell your doctor if you have kidney disease, liver disease, glaucoma, lung problems, or a history of drug or alcohol abuse, depression, mental health problems, or seizures  · This medicine may cause the following problems:  ¨ Respiratory depression (serious breathing problem that can be life-threatening), when used with narcotic pain medicines  · This medicine can increase thoughts of suicide  Tell your doctor right away if you start to feel depressed and have thoughts about hurting yourself  · This medicine may make you dizzy or drowsy  Do not drive or do anything else that could be dangerous until you know how this medicine affects you  · This medicine can be habit-forming  Do not use more than your prescribed dose  Call your doctor if you think your medicine is not working  · Do not stop using this medicine suddenly  Your doctor will need to slowly decrease your dose before you stop it completely  · Your doctor will do lab tests at regular visits to check on the effects of this medicine  Keep all appointments  · Keep all medicine out of the reach of children  Never share your medicine with anyone  Possible Side Effects While Using This Medicine:   Call your doctor right away if you notice any of these side effects:  · Allergic reaction: Itching or hives, swelling in your face or hands, swelling or tingling in your mouth or throat, chest tightness, trouble breathing  · Blistering, peeling, red skin rash  · Blue lips, fingernails, or skin  · Confusion, lightheadedness, dizziness, problems with coordination or memory  · Extreme drowsiness or weakness, slow heartbeat, trouble breathing  · Seizure  If you notice these less serious side effects, talk with your doctor:   · Change in appetite or weight  · Constipation  If you notice other side effects that you think are caused by this medicine, tell your doctor  Call your doctor for medical advice about side effects   You may report side effects to FDA at 4-481-FDA-1112  © 2017 [employer] Information is for End User's use only and may not be sold, redistributed or otherwise used for commercial purposes  The above information is an  only  It is not intended as medical advice for individual conditions or treatments  Talk to your doctor, nurse or pharmacist before following any medical regimen to see if it is safe and effective for you  Metabolic Syndrome X   WHAT YOU NEED TO KNOW:   What is metabolic syndrome? Metabolic syndrome is a group of medical conditions that can increase your risk for heart disease, stroke, or type 2 diabetes  You may have metabolic syndrome if you have at least 3 of the following medical conditions:  · High triglycerides (a type of fat in your blood)    · Low HDL cholesterol (good cholesterol)    · High blood pressure    · High blood sugar levels    · Extra abdominal fat  What increases my risk for metabolic syndrome? The exact cause of metabolic syndrome is not known  Your risk for metabolic syndrome increases if you have insulin resistance  Insulin is a hormone that helps your body take sugar out of your blood and use it for energy  Insulin resistance means your pancreas keeps making insulin but your body cannot use it correctly  Your risk for metabolic syndrome also increases as you age, if you are overweight or obese, or you do not exercise  What are the signs and symptoms of metabolic syndrome? Most people with metabolic syndrome do not have any signs or symptoms  You may have more thirst or hunger than usual, urinate more often, or have blurred vision or headaches  How is metabolic syndrome diagnosed? Your healthcare provider will examine you and ask about other medical conditions you may have  He may ask if you have any family members with metabolic syndrome, diabetes, obesity, or heart disease  · Blood tests: These are used to check your glucose and cholesterol levels  · Oral glucose tolerance test:  Your blood sugar level is tested after you fast for 8 hours, then again after you are given a glucose drink   The test measures how high your blood sugar level rises from the glucose drink  How is metabolic syndrome treated? · Cholesterol medicine: This type of medicine is given to help decrease (lower) the amount of cholesterol (fat) in your blood  Cholesterol medicine works best if you also exercise and eat a healthy diet that is low in certain kinds of fats  Some cholesterol medicines may cause liver problems  You may need to have blood taken for tests while using this medicine  · Blood pressure medicine: This is given to lower your blood pressure  A controlled blood pressure helps protect your organs, such as your heart, lungs, brain, and kidneys  Take your blood pressure medicine exactly as directed  · Hypoglycemic medicine: This medicine may be given to decrease the amount of sugar in your blood  Hypoglycemic medicine helps your body move the sugar to your cells, where it is needed for energy  What are the risks of metabolic syndrome? If untreated, metabolic syndrome increases your risk of heart disease, stroke, and diabetes  These conditions lead to life-threatening illness  How can I manage metabolic syndrome? · Maintain a healthy weight:  Weight loss helps lower cholesterol, triglycerides, blood pressure, and blood glucose levels  It can also raise HDL (good cholesterol)  Ask your healthcare provider how much you should weigh  Ask him to help you create a weight loss plan if you are overweight  · Eat a variety of healthy foods:  Healthy foods include fruits, vegetables, whole-grain breads, low-fat dairy products, beans, lean meats, and fish  Eat foods that are low in fat and sodium (salt)  A dietitian can help you plan healthy meals  · Exercise:  Ask your healthcare provider to help you create an exercise plan  Exercise can help lower your blood pressure, cholesterol, and blood sugar levels  Exercise can also help raise your HDL level and help you to lose weight   Get at least 30 minutes of exercise, 5 days each week     · Check your blood pressure as directed: You may be asked to keep a record of your blood pressure and bring it with you to follow-up visits  Ask your healthcare provider what your blood pressure should be and how to check it  · Limit alcohol:  Women should limit alcohol to 1 drink a day  Men should limit alcohol to 2 drinks a day  A drink of alcohol is 12 ounces of beer, 5 ounces of wine, or 1½ ounces of liquor  · Do not smoke: If you smoke, it is never too late to quit  Smoking further increases your risk for heart disease and stroke  Ask your healthcare provider for information if you need help quitting  When should I contact my healthcare provider? · You have more thirst or hunger than usual      · You urinate more frequently  · You have blurred vision  · You have questions or concerns about your condition or care  When should I seek immediate care or call 911? · Your blood pressure is higher than your healthcare provider told you it should be  · You have chest pain or discomfort that spreads to your arms, jaw, or back  · You have a severe headache or dizziness  · You have trouble thinking, speaking, or understanding others  CARE AGREEMENT:   You have the right to help plan your care  Learn about your health condition and how it may be treated  Discuss treatment options with your caregivers to decide what care you want to receive  You always have the right to refuse treatment  The above information is an  only  It is not intended as medical advice for individual conditions or treatments  Talk to your doctor, nurse or pharmacist before following any medical regimen to see if it is safe and effective for you  © 2017 [employer] Information is for End User's use only and may not be sold, redistributed or otherwise used for commercial purposes   All illustrations and images included in CareNotes® are the copyrighted property of [employer]  or [employer]

## 2018-06-27 NOTE — NURSING NOTE
Patient has been restful in bed with eyes closed  No verbalization of SI this shift to this writer  Will continue to observe and monitor for changes  Safety maintained per q 15 min checks

## 2018-06-27 NOTE — PLAN OF CARE
Patient attended groups yesterday in AM and was appropriate in his participation  He has been spend time in his room reading a book independently  He is very independent and volunteers and helps others weekly when at home  He is ready to discharge and get back to his life  Discharge is planned for today 6/27/18

## 2018-06-27 NOTE — PLAN OF CARE
Problem: DISCHARGE PLANNING  Goal: Discharge to home or other facility with appropriate resources  INTERVENTIONS:  - Identify barriers to discharge w/patient and caregiver  - Arrange for needed discharge resources and transportation as appropriate  - Coordinate discharge planning if the patient needs post-hospital services based on physician/advanced practitioner order or complex needs related to functional status, cognitive ability, or social support system   Outcome: Completed Date Met: 06/27/18

## 2018-06-27 NOTE — NURSING NOTE
Patient discharged to significant other this afternoon after discharge instructions were reviewed  Patient provided with educational materials as well belongings that had been reviewed by CNA staff

## 2018-06-27 NOTE — PROGRESS NOTES
[facility]  [id number]    [id number]  Adm Date: 6/25/2018 0119  1:19 AM   ATT PHY: Luis Ragsdale         Subjective     The patient was seen after reviewing the chart and discussing the case with caring staff  Today during our encounter, the patient reported no acute concerns  Labs are still pending on the patient  The POA's number also has yet to be recorded  Of note, patient is scheduled for discharge today  Patient is medically cleared for discharge  Medication list has been reconciled  Scripts have been filled out by Dr Ramses Hinton  Objective     Vitals:    06/27/18 0626   BP: 135/77   Pulse: 82   Resp: 18   Temp: (!) 96 5 °F (35 8 °C)   SpO2: 96%       General Appearance: Awake and Alert  No acute distress  HEENT: Normocephalic, atraumatic  PERRLA, EOMI, MMM  Heart: RRR, no murmurs  Normal S1 and S2   Lungs: CTA bilaterally with fair air entry  Assessment     Mr Jose Waters is a 59year old male with depression      1  Cardiac with history of HTN  lisinopril 5mg  2  Gout  Patient will continue on allopurinol  3  History of Vitamin D Deficiency  Vitamin D level is pending  Patient is on Vitamin D3 1000U daily  4  History Vitamin B12 Deficiency  Vitamin B12 level is pending  Patient is on oral Vitamin B12 1000mcg daily  5  Hypothyroidism  Patient will continue on levothyroxine 100mcg daily  TSH is pending  6  DJD/OA of knees  Ibuprofen and Tramadol as needed  7  Depression  This will be managed by the psych team     The patient was discussed with Dr Rosie Bryan and he is in agreement with the above note

## 2019-07-16 PROBLEM — K11.8 PAROTID MASS: Status: ACTIVE | Noted: 2019-07-16

## 2019-08-10 ENCOUNTER — HOSPITAL ENCOUNTER (OUTPATIENT)
Dept: MRI IMAGING | Facility: HOSPITAL | Age: 64
Discharge: HOME | End: 2019-08-10
Attending: OTOLARYNGOLOGY
Payer: COMMERCIAL

## 2019-08-10 DIAGNOSIS — K11.8 PAROTID MASS: ICD-10-CM

## 2019-08-10 PROCEDURE — A9585 GADOBUTROL INJECTION: HCPCS | Performed by: RADIOLOGY

## 2019-08-10 PROCEDURE — 70543 MRI ORBT/FAC/NCK W/O &W/DYE: CPT

## 2019-08-10 RX ADMIN — GADOBUTROL 9 ML: 604.72 INJECTION INTRAVENOUS at 09:43

## 2019-10-28 NOTE — PRE-PROCEDURE INSTRUCTIONS
Pre-Surgery Instructions:   Medication Instructions    allopurinol (ZYLOPRIM) 100 mg tablet Instructed patient per Anesthesia Guidelines   ALPRAZolam (XANAX) 0 5 mg tablet Instructed patient per Anesthesia Guidelines   Ascorbic Acid (VITAMIN C) 100 MG CHEW Instructed patient per Anesthesia Guidelines   Cholecalciferol (VITAMIN D3) 1000 units CAPS Instructed patient per Anesthesia Guidelines   cyanocobalamin (VITAMIN B-12) 100 mcg tablet Instructed patient per Anesthesia Guidelines   levothyroxine 50 mcg tablet Instructed patient per Anesthesia Guidelines   lisinopril (ZESTRIL) 5 mg tablet Instructed patient per Anesthesia Guidelines  Pre-op and bathing instructions given  Patient advised to purchase hibiclens

## 2019-11-26 ENCOUNTER — HOSPITAL ENCOUNTER (OUTPATIENT)
Dept: NON INVASIVE DIAGNOSTICS | Facility: HOSPITAL | Age: 64
Discharge: HOME | End: 2019-11-26
Attending: OTOLARYNGOLOGY
Payer: COMMERCIAL

## 2019-11-26 ENCOUNTER — LAB (OUTPATIENT)
Dept: LAB | Facility: HOSPITAL | Age: 64
End: 2019-11-26
Attending: OTOLARYNGOLOGY
Payer: COMMERCIAL

## 2019-11-26 DIAGNOSIS — K11.8 PAROTID MASS: ICD-10-CM

## 2019-11-26 LAB
ANION GAP SERPL CALCULATED.3IONS-SCNC: 6 MMOL/L (ref 4–13)
BASOPHILS # BLD AUTO: 0.09 THOUSANDS/ΜL (ref 0–0.1)
BASOPHILS NFR BLD AUTO: 1 % (ref 0–1)
BUN SERPL-MCNC: 18 MG/DL (ref 5–25)
CALCIUM SERPL-MCNC: 9.2 MG/DL (ref 8.3–10.1)
CHLORIDE SERPL-SCNC: 105 MMOL/L (ref 100–108)
CO2 SERPL-SCNC: 30 MMOL/L (ref 21–32)
CREAT SERPL-MCNC: 1.19 MG/DL (ref 0.6–1.3)
EOSINOPHIL # BLD AUTO: 0.22 THOUSAND/ΜL (ref 0–0.61)
EOSINOPHIL NFR BLD AUTO: 3 % (ref 0–6)
ERYTHROCYTE [DISTWIDTH] IN BLOOD BY AUTOMATED COUNT: 13.4 % (ref 11.6–15.1)
GFR SERPL CREATININE-BSD FRML MDRD: 65 ML/MIN/1.73SQ M
GLUCOSE SERPL-MCNC: 94 MG/DL (ref 65–140)
HCT VFR BLD AUTO: 49.6 % (ref 36.5–49.3)
HGB BLD-MCNC: 16 G/DL (ref 12–17)
IMM GRANULOCYTES # BLD AUTO: 0.03 THOUSAND/UL (ref 0–0.2)
IMM GRANULOCYTES NFR BLD AUTO: 0 % (ref 0–2)
LYMPHOCYTES # BLD AUTO: 1.78 THOUSANDS/ΜL (ref 0.6–4.47)
LYMPHOCYTES NFR BLD AUTO: 25 % (ref 14–44)
MCH RBC QN AUTO: 31.7 PG (ref 26.8–34.3)
MCHC RBC AUTO-ENTMCNC: 32.3 G/DL (ref 31.4–37.4)
MCV RBC AUTO: 98 FL (ref 82–98)
MONOCYTES # BLD AUTO: 0.55 THOUSAND/ΜL (ref 0.17–1.22)
MONOCYTES NFR BLD AUTO: 8 % (ref 4–12)
NEUTROPHILS # BLD AUTO: 4.57 THOUSANDS/ΜL (ref 1.85–7.62)
NEUTS SEG NFR BLD AUTO: 63 % (ref 43–75)
NRBC BLD AUTO-RTO: 0 /100 WBCS
PLATELET # BLD AUTO: 132 THOUSANDS/UL (ref 149–390)
PMV BLD AUTO: 10.7 FL (ref 8.9–12.7)
POTASSIUM SERPL-SCNC: 4.5 MMOL/L (ref 3.5–5.3)
RBC # BLD AUTO: 5.04 MILLION/UL (ref 3.88–5.62)
SODIUM SERPL-SCNC: 141 MMOL/L (ref 136–145)
WBC # BLD AUTO: 7.24 THOUSAND/UL (ref 4.31–10.16)

## 2019-11-26 PROCEDURE — 85025 COMPLETE CBC W/AUTO DIFF WBC: CPT

## 2019-11-26 PROCEDURE — 80048 BASIC METABOLIC PNL TOTAL CA: CPT

## 2019-11-26 PROCEDURE — 93005 ELECTROCARDIOGRAM TRACING: CPT

## 2019-11-26 PROCEDURE — 36415 COLL VENOUS BLD VENIPUNCTURE: CPT

## 2019-11-27 LAB
ATRIAL RATE: 59 BPM
P AXIS: 32 DEGREES
PR INTERVAL: 186 MS
QRS AXIS: -27 DEGREES
QRSD INTERVAL: 118 MS
QT INTERVAL: 416 MS
QTC INTERVAL: 411 MS
T WAVE AXIS: 59 DEGREES
VENTRICULAR RATE: 59 BPM

## 2019-11-27 PROCEDURE — 93010 ELECTROCARDIOGRAM REPORT: CPT | Performed by: INTERNAL MEDICINE

## 2019-12-04 ENCOUNTER — ANESTHESIA EVENT (OUTPATIENT)
Dept: PERIOP | Facility: HOSPITAL | Age: 64
End: 2019-12-04
Payer: COMMERCIAL

## 2019-12-05 ENCOUNTER — HOSPITAL ENCOUNTER (OUTPATIENT)
Facility: HOSPITAL | Age: 64
Setting detail: OUTPATIENT SURGERY
Discharge: HOME | End: 2019-12-05
Attending: OTOLARYNGOLOGY | Admitting: OTOLARYNGOLOGY
Payer: COMMERCIAL

## 2019-12-05 ENCOUNTER — ANESTHESIA (OUTPATIENT)
Dept: PERIOP | Facility: HOSPITAL | Age: 64
End: 2019-12-05
Payer: COMMERCIAL

## 2019-12-05 VITALS
RESPIRATION RATE: 18 BRPM | BODY MASS INDEX: 30.16 KG/M2 | SYSTOLIC BLOOD PRESSURE: 136 MMHG | TEMPERATURE: 98.5 F | HEIGHT: 74 IN | HEART RATE: 76 BPM | DIASTOLIC BLOOD PRESSURE: 80 MMHG | OXYGEN SATURATION: 93 % | WEIGHT: 235 LBS

## 2019-12-05 DIAGNOSIS — K11.8 PAROTID MASS: Primary | ICD-10-CM

## 2019-12-05 PROCEDURE — 88307 TISSUE EXAM BY PATHOLOGIST: CPT | Performed by: PATHOLOGY

## 2019-12-05 PROCEDURE — 42415 EXCISE PAROTID GLAND/LESION: CPT | Performed by: OTOLARYNGOLOGY

## 2019-12-05 RX ORDER — CEFAZOLIN SODIUM 1 G/3ML
INJECTION, POWDER, FOR SOLUTION INTRAMUSCULAR; INTRAVENOUS AS NEEDED
Status: DISCONTINUED | OUTPATIENT
Start: 2019-12-05 | End: 2019-12-05 | Stop reason: SURG

## 2019-12-05 RX ORDER — DIPHENHYDRAMINE HYDROCHLORIDE 50 MG/ML
12.5 INJECTION INTRAMUSCULAR; INTRAVENOUS ONCE AS NEEDED
Status: DISCONTINUED | OUTPATIENT
Start: 2019-12-05 | End: 2019-12-05 | Stop reason: HOSPADM

## 2019-12-05 RX ORDER — DEXAMETHASONE SODIUM PHOSPHATE 4 MG/ML
INJECTION, SOLUTION INTRA-ARTICULAR; INTRALESIONAL; INTRAMUSCULAR; INTRAVENOUS; SOFT TISSUE AS NEEDED
Status: DISCONTINUED | OUTPATIENT
Start: 2019-12-05 | End: 2019-12-05 | Stop reason: SURG

## 2019-12-05 RX ORDER — LIDOCAINE HYDROCHLORIDE 10 MG/ML
0.5 INJECTION, SOLUTION EPIDURAL; INFILTRATION; INTRACAUDAL; PERINEURAL ONCE AS NEEDED
Status: DISCONTINUED | OUTPATIENT
Start: 2019-12-05 | End: 2019-12-05 | Stop reason: HOSPADM

## 2019-12-05 RX ORDER — EPHEDRINE SULFATE 50 MG/ML
INJECTION INTRAVENOUS AS NEEDED
Status: DISCONTINUED | OUTPATIENT
Start: 2019-12-05 | End: 2019-12-05 | Stop reason: SURG

## 2019-12-05 RX ORDER — ONDANSETRON 2 MG/ML
INJECTION INTRAMUSCULAR; INTRAVENOUS AS NEEDED
Status: DISCONTINUED | OUTPATIENT
Start: 2019-12-05 | End: 2019-12-05 | Stop reason: SURG

## 2019-12-05 RX ORDER — LIDOCAINE HYDROCHLORIDE AND EPINEPHRINE 10; 10 MG/ML; UG/ML
INJECTION, SOLUTION INFILTRATION; PERINEURAL AS NEEDED
Status: DISCONTINUED | OUTPATIENT
Start: 2019-12-05 | End: 2019-12-05 | Stop reason: HOSPADM

## 2019-12-05 RX ORDER — FENTANYL CITRATE 50 UG/ML
INJECTION, SOLUTION INTRAMUSCULAR; INTRAVENOUS AS NEEDED
Status: DISCONTINUED | OUTPATIENT
Start: 2019-12-05 | End: 2019-12-05 | Stop reason: SURG

## 2019-12-05 RX ORDER — SODIUM CHLORIDE, SODIUM LACTATE, POTASSIUM CHLORIDE, CALCIUM CHLORIDE 600; 310; 30; 20 MG/100ML; MG/100ML; MG/100ML; MG/100ML
125 INJECTION, SOLUTION INTRAVENOUS CONTINUOUS
Status: DISCONTINUED | OUTPATIENT
Start: 2019-12-05 | End: 2019-12-05 | Stop reason: HOSPADM

## 2019-12-05 RX ORDER — ACETAMINOPHEN 325 MG/1
650 TABLET ORAL EVERY 6 HOURS PRN
Status: DISCONTINUED | OUTPATIENT
Start: 2019-12-05 | End: 2019-12-05 | Stop reason: HOSPADM

## 2019-12-05 RX ORDER — OXYCODONE HYDROCHLORIDE 5 MG/1
5 TABLET ORAL EVERY 4 HOURS PRN
Qty: 30 TABLET | Refills: 0 | Status: SHIPPED | OUTPATIENT
Start: 2019-12-05 | End: 2019-12-15

## 2019-12-05 RX ORDER — ONDANSETRON 2 MG/ML
4 INJECTION INTRAMUSCULAR; INTRAVENOUS EVERY 4 HOURS PRN
Status: DISCONTINUED | OUTPATIENT
Start: 2019-12-05 | End: 2019-12-05 | Stop reason: HOSPADM

## 2019-12-05 RX ORDER — OXYCODONE HCL 5 MG/5 ML
5 SOLUTION, ORAL ORAL EVERY 4 HOURS PRN
Status: DISCONTINUED | OUTPATIENT
Start: 2019-12-05 | End: 2019-12-05 | Stop reason: HOSPADM

## 2019-12-05 RX ORDER — PROPOFOL 10 MG/ML
INJECTION, EMULSION INTRAVENOUS AS NEEDED
Status: DISCONTINUED | OUTPATIENT
Start: 2019-12-05 | End: 2019-12-05 | Stop reason: SURG

## 2019-12-05 RX ORDER — LIDOCAINE HYDROCHLORIDE 10 MG/ML
INJECTION, SOLUTION INFILTRATION; PERINEURAL AS NEEDED
Status: DISCONTINUED | OUTPATIENT
Start: 2019-12-05 | End: 2019-12-05 | Stop reason: SURG

## 2019-12-05 RX ORDER — MIDAZOLAM HYDROCHLORIDE 2 MG/2ML
INJECTION, SOLUTION INTRAMUSCULAR; INTRAVENOUS AS NEEDED
Status: DISCONTINUED | OUTPATIENT
Start: 2019-12-05 | End: 2019-12-05 | Stop reason: SURG

## 2019-12-05 RX ORDER — FENTANYL CITRATE/PF 50 MCG/ML
25 SYRINGE (ML) INJECTION
Status: DISCONTINUED | OUTPATIENT
Start: 2019-12-05 | End: 2019-12-05 | Stop reason: HOSPADM

## 2019-12-05 RX ORDER — SUCCINYLCHOLINE/SOD CL,ISO/PF 100 MG/5ML
SYRINGE (ML) INTRAVENOUS AS NEEDED
Status: DISCONTINUED | OUTPATIENT
Start: 2019-12-05 | End: 2019-12-05 | Stop reason: SURG

## 2019-12-05 RX ADMIN — FENTANYL CITRATE 25 MCG: 50 INJECTION INTRAMUSCULAR; INTRAVENOUS at 14:36

## 2019-12-05 RX ADMIN — ONDANSETRON 4 MG: 2 INJECTION INTRAMUSCULAR; INTRAVENOUS at 13:08

## 2019-12-05 RX ADMIN — EPHEDRINE SULFATE 5 MG: 50 INJECTION, SOLUTION INTRAVENOUS at 13:47

## 2019-12-05 RX ADMIN — CEFAZOLIN SODIUM 2000 MG: 1 INJECTION, POWDER, FOR SOLUTION INTRAMUSCULAR; INTRAVENOUS at 13:26

## 2019-12-05 RX ADMIN — PROPOFOL 200 MG: 10 INJECTION, EMULSION INTRAVENOUS at 13:16

## 2019-12-05 RX ADMIN — MIDAZOLAM HYDROCHLORIDE 2 MG: 1 INJECTION, SOLUTION INTRAMUSCULAR; INTRAVENOUS at 13:08

## 2019-12-05 RX ADMIN — FENTANYL CITRATE 25 MCG: 50 INJECTION INTRAMUSCULAR; INTRAVENOUS at 15:41

## 2019-12-05 RX ADMIN — FENTANYL CITRATE 25 MCG: 50 INJECTION INTRAMUSCULAR; INTRAVENOUS at 15:36

## 2019-12-05 RX ADMIN — DEXAMETHASONE SODIUM PHOSPHATE 8 MG: 4 INJECTION, SOLUTION INTRAMUSCULAR; INTRAVENOUS at 13:21

## 2019-12-05 RX ADMIN — Medication 0.1 MCG/KG/MIN: at 13:23

## 2019-12-05 RX ADMIN — SODIUM CHLORIDE, SODIUM LACTATE, POTASSIUM CHLORIDE, AND CALCIUM CHLORIDE: .6; .31; .03; .02 INJECTION, SOLUTION INTRAVENOUS at 12:12

## 2019-12-05 RX ADMIN — LIDOCAINE HYDROCHLORIDE 50 MG: 10 INJECTION, SOLUTION INFILTRATION; PERINEURAL at 13:16

## 2019-12-05 RX ADMIN — FENTANYL CITRATE 25 MCG: 50 INJECTION INTRAMUSCULAR; INTRAVENOUS at 13:16

## 2019-12-05 RX ADMIN — EPHEDRINE SULFATE 15 MG: 50 INJECTION, SOLUTION INTRAVENOUS at 13:31

## 2019-12-05 RX ADMIN — Medication 100 MG: at 13:16

## 2019-12-05 RX ADMIN — OXYCODONE HYDROCHLORIDE 5 MG: 5 SOLUTION ORAL at 16:50

## 2019-12-05 RX ADMIN — FENTANYL CITRATE 25 MCG: 50 INJECTION INTRAMUSCULAR; INTRAVENOUS at 14:40

## 2019-12-05 RX ADMIN — FENTANYL CITRATE 25 MCG: 50 INJECTION INTRAMUSCULAR; INTRAVENOUS at 15:46

## 2019-12-05 RX ADMIN — EPHEDRINE SULFATE 10 MG: 50 INJECTION, SOLUTION INTRAVENOUS at 13:49

## 2019-12-05 NOTE — ANESTHESIA PREPROCEDURE EVALUATION
Review of Systems/Medical History  Patient summary reviewed  Chart reviewed  History of anesthetic complications (nausea/sweating in PACU after dilaudid administration)     Cardiovascular  Exercise tolerance (METS): >4,  Hyperlipidemia, Hypertension ,    Pulmonary  Smoker (recently quit smoking) ex-smoker  ,        GI/Hepatic  Negative GI/hepatic ROS          Negative  ROS        Endo/Other  Diabetes well controlled type 2 Diet controlled, History of thyroid disease , hypothyroidism,   Obesity (BMI 30)    GYN       Hematology  Negative hematology ROS      Musculoskeletal  Negative musculoskeletal ROS Gout,        Neurology  Negative neurology ROS      Psychology   Anxiety, Depression ,            Physical Exam    Airway    Mallampati score: I  TM Distance: >3 FB  Neck ROM: full     Dental   No notable dental hx     Cardiovascular      Pulmonary      Other Findings       Lab Results   Component Value Date    WBC 7 24 11/26/2019    HGB 16 0 11/26/2019     (L) 11/26/2019     Lab Results   Component Value Date    SODIUM 141 11/26/2019    K 4 5 11/26/2019    BUN 18 11/26/2019    CREATININE 1 19 11/26/2019    EGFR 65 11/26/2019    Glucose     94    11/26/2019    Anesthesia Plan  ASA Score- 2     Anesthesia Type- general with ASA Monitors  Additional Monitors:   Airway Plan: ETT  Plan Factors-    Induction- intravenous  Postoperative Plan-     Informed Consent- Anesthetic plan and risks discussed with patient  I personally reviewed this patient with the CRNA  Discussed and agreed on the Anesthesia Plan with the CRNA  Amelia Thakkar

## 2019-12-05 NOTE — OP NOTE
OPERATIVE REPORT  PATIENT NAME: Hieu Wasserman    :  1955  MRN: [id number]  Pt Location: AN OR ROOM 01    SURGERY DATE: 2019    Surgeon(s) and Role:     * Arielle Garcia MD - Primary     * Risa Magana MD - Assisting    Preop Diagnosis:  Parotid mass [K11 8]    Post-Op Diagnosis Codes:     * Parotid mass [K11 8]    Procedure(s) (LRB):  SUPERFICIAL PAROTIDECTOMY WITH NERVE MONITOR (Left)    Specimen(s):  ID Type Source Tests Collected by Time Destination   1 : Left Parotid Tissue Lesion TISSUE EXAM Arielle Garcia MD 2019 1345        Estimated Blood Loss:   15 mL    Drains:  Closed/Suction Drain Left Other (Comment) Bulb 15 Fr  (Active)   Number of days: 0       Anesthesia Type:   General    Operative Indications:  Parotid mass [K11 8]      Operative Findings:  Left parotid mass  All branches of the facial nerve stimulated at 0 8 mA at the end of the case  Complications:   None    Procedure and Technique:  The patient was positively identified and transferred onto the operating table in the supine position  Appropriate monitoring devices were put in place, anesthesia was induced and the patient was intubated without difficulty  The operating room table was turned 90 degrees, and the patients right neck was examined  Palpation confirmed the presence of a large tumor in the inferior aspect of the parotid gland  An appropriate site for skin incision was demarcated anterior to the left ear, curving beneath and behind the lobule of the ear, down into the neck in curvilinear fashion  Before proceeding further, the timeout process was completed  Local anesthesia in the form of 1% lidocaine with 1/100,000 epinephrine was then injected to the marked site  Electrodes for facial nerve monitoring were put in place by the Impulse nerve monitoring staff, and monitoring was noted to be functioning appropriately  The patients neck was then prepped and draped in the usual sterile fashion      Hash marks were created along the skin to aid in closure  Skin incision was then made at the marked site using a 15 blade, which was used to continue dissection through subcutaneous tissues  A superficial skin flap was elevated using Bovie cautery, and this flap was held in a retracted position using a 2-0 silk stitch  Dissection then continued down to the sternocleidomastoid muscle posteriorly using Bovie cautery  A large encapsulated tumor was noted to be present within the superficial lobe of the parotid gland  We dissected around the tumor to find the nerve distally  Distal branches were found and traced back to the pez  Dissection continued using mosquito forceps and bipolar cautery, allowing mobilization of inferior facial nerve branches superiorly off the tumor  Dissection continued through parotid tissue using mosquito forceps and bipolar cautery, and the superficial lobe of the parotid gland, with attached tumor from within the deep lobe and portion of the deep lobe of the parotid gland was removed, and sent to pathology for permanent section evaluation  The surgical site was irrigated with saline, which was suctioned free, and hemomstatis was accomplished using bipolar cautery  The main trunk of the nerve was stimulated and all branches were functioning at 0 8 mA  A 15  Ardyth Pasha drain was placed through a separate stab incision  The surgical site was closed by re-approximating deeper tissue using 3-0 Vicryl sutures  Skin itself was closed using a 5-0 and 6-0 Prolene suture in a running fashion followed by Bacitracin ointment  Drapes were removed, and anesthesia was reversed  The patient was awakened, extubated and taken to the recovery room in stable condition  All counts were correct at the end of the case, and no complications were encountered         I was present for the entire procedure, A qualified resident physician was not available and A co-surgeon was required because of skills and techniques relevant to speciality    Patient Disposition:  PACU  and extubated and stable    SIGNATURE: Betsey Culp MD  DATE: December 5, 2019  TIME: 3:11 PM

## 2019-12-05 NOTE — H&P
H&P Exam - ENT   Jovany Boogie 62 y o  male MRN: [id number]  Unit/Bed#: OR POOL Encounter: [id number]    Assessment/Plan     Assessment:  62 M with L parotid mass  Plan:  L superficial parotidectomy with NIMS  Discussed CN 7 injury, Tata, cosmesis, recurrence rate    History of Present Illness   HPI:  Jovany Boogie is a 62 y o  male who presents with L parotid mass, likely PA on FNAB  Review of Systems    Historical Information   Past Medical History:   Diagnosis Date    Anxiety     B12 deficiency     Chronic kidney disease     Depression     Diabetes mellitus ([facility] [address] )     type II, now resolved after weight loss and diet/exercise controlled currently    Disease of thyroid gland     Gout     Gout     Hypertension     Kidney function abnormal     Knee osteoarthritis     Panic attack     Prostate nodule     Psoriasis     Vitamin D deficiency     Wears glasses      Past Surgical History:   Procedure Laterality Date    APPENDECTOMY      COSMETIC SURGERY  2017    body lift after weight loss    INGUINAL HERNIA REPAIR Right     JOINT REPLACEMENT Left     hip repair in childhood      US GUIDED KIDNEY BIOPSY       Social History   Social History     Substance and Sexual Activity   Alcohol Use Yes    Alcohol/week: 2 0 standard drinks    Types: 2 Glasses of wine per week    Frequency: 4 or more times a week    Drinks per session: 1 or 2    Binge frequency: Never    Comment: Wine with dinner      Social History     Substance and Sexual Activity   Drug Use No     Social History     Tobacco Use   Smoking Status Former Smoker    Years: 30 00    Last attempt to quit: 2013    Years since quittin 4   Smokeless Tobacco Never Used     Family History: non-contributory    Meds/Allergies   all medications and allergies reviewed  Allergies   Allergen Reactions    Hydromorphone GI Intolerance     Profuse sweating, nausea       Objective   Vitals: Blood pressure 167/85, pulse 65, temperature (!) 97 1 °F (36 2 °C), temperature source Temporal, resp  rate 18, height 6' 2" (1 88 m), weight 107 kg (235 lb), SpO2 95 %  No intake or output data in the 24 hours ending 12/05/19 1234    Invasive Devices     Peripheral Intravenous Line            Peripheral IV 12/05/19 Left Forearm less than 1 day                Physical Exam  NAD  AAOx3  CTAB  RRR  Abd soft NT/ND  GARCIA    TM/EAC clear bilaterally  OC/OP clear  Neck supple without lymph adenopathy - L parotid mass, cn7 intact  Nares clear on anterior rhinoscopy       Lab Results: I have personally reviewed pertinent lab results  Imaging: I have personally reviewed pertinent reports  and I have personally reviewed pertinent films in PACS  EKG, Pathology, and Other Studies: I have personally reviewed pertinent reports  and I have personally reviewed pertinent films in PACS    Code Status: Prior  Advance Directive and Living Will:      Power of :    POLST:      Counseling/Coordination of Care: Total floor / unit time spent today 30 minutes  Greater than 50% of total time was spent with the patient and / or family counseling and / or coordination of care   A description of the counseling / coordination of care: 30

## 2019-12-05 NOTE — DISCHARGE INSTRUCTIONS
[facility]  Facial Plastic & Reconstructive Surgery   Post-Operative Care  Office [phone]  Cell [phone]               At Home (in the days immediately following the procedure):          Try to sleep with your head elevated on 2-3 pillows   Iced packs placed over wound will help reduce swelling  They should be used 20 minutes on/ 20 minutes off while awake for the first full day  Crushed ice in ziplock bags or frozen peas or corn work well   The neck should be cleaned with a Q-tip soaked in hydrogen peroxide mixed 50/50 with water   Apply antibiotic ointment (Bacitracin) or Vaseline to the external incision 3-4 times per day   Take your medicines as prescribed  REMEMBER:  DO NOT DRIVE WHILE TAKING PAIN MEDICATIONS   You may shower with luke-warm water only    You may use ibuprofen (Motrin, Advil) and acetaminophen (Tyelnol) for pain control in addition to any prescribed pain medications  You may get out of bed and go to the bathroom with assistance  Eat light, soft meals as tolerated, avoiding gas-stimulating foods  Follow-up care:   Eat before coming to the office for post-operative visits  Rest for the first week after the procedure, avoiding excessive physical activities, hard chewing, lifting objects over 8 lbs (about the weight of a phone book), or bending over  We request that you do not travel by plane for one week after surgery  Clean the wound at least twice daily using 1/2 hydrogen peroxide 1/2 water solution to remove any crusting (scabbing)  Lubricate your wound after cleaning with Q-tips and antibiotic ointment to soften hardened crusts  Follow-up visits: At one week, the sutures will be removed; you may drive yourself to this appointment (as long as you are no longer taking prescription/narcotic pain medicines)  After dressing removal, make-up may be worn, avoiding the incision lines  Additional follow-up visits will be scheduled at this time  Note that final results may not be apparent until [place] after surgery  Healing Care:   After surgery try not to roll onto the wound while asleep  Clean the external skin gently but thoroughly with soap  The use of alcohol and tobacco products prolong swelling and healing and are best avoided for 2 weeks after surgery  Do not expose your wound to sun for 4-6 weeks after surgery  Use sunscreen (SPF 30 or higher) for 6 months after surgery if sun exposure is absolutely necessary  Avoid any physical exercise that can cause over-heating or over-exertion for two weeks after the surgery  Your nose may be swollen and stuffy for several months  Complete healing may take 12 months  It is to your advantage to return for all postoperative visits so that long-term results may be evaluated  Frequently Asked Questions:  When can I shower and shampoo my hair? You may shower the day after your surgery, BUT KEEP ANY DRESSING DRY  This may mean you wash your face/hair in the sink instead  It is important that you do not use hot water, as this can increase the swelling  Lukewarm water is best       When will the swelling and bruising go away? This usually takes 7-10 days or so, but may take less or more time, depending on the individual     When can I take aspirin? You should not take aspirin for 2 weeks prior to or after surgery  The same is true for vitamin E, ginko, garlic pills, and other natural supplements  When can I take ibuprofen? Non-steroidal anti-inflammatory drugs such as advil (ibuprofen), alleve (naproxen), or other similar may be used immediately after surgery as per the guidelines on the package  When can I wear my glasses? You will be able to wear contact lenses as soon as you feel comfortable  You may wear glasses one to two weeks after surgery, depending on the type of surgery you had  In any case, you must be careful not to place any pressure on the wound       When can I wear makeup? Make-up can be applied after suture removal, but not directly on the incisions until 3 weeks after the procedure  When will I see my results? Final results in any reconstructive surgery can take 12 months  However, notable changes should be evident in the weeks immediately after the procedure  What about exercise? Please adhere to the following schedule:   Up to week 1 after surgery:  REST! No strenuous exercise  Walking is ok  Week 1-3 after surgery: You may begin light aerobic exercise, but no bending over/straining/lifting weights  Week 3+: You may begin more strenuous exercise, such as yoga, stretching, bending over, lifting weights  Please remember to start slowly  Week 6+: You may resume contact sports, such as soccer, basketball, etc    How to contact us:    Phone: If you have questions or concerns, please call us at [phone] during business hours (8 am to 5 pm)  On nights and weekends, you may page the ENT surgeon on call  at [address]   In case of emergency, please call [phone]

## 2019-12-05 NOTE — ANESTHESIA POSTPROCEDURE EVALUATION
Post-Op Assessment Note    CV Status:  Stable  Pain Score: 0    Pain management: adequate     Mental Status:  Alert and awake   Hydration Status:  Euvolemic   PONV Controlled:  Controlled   Airway Patency:  Patent   Post Op Vitals Reviewed: Yes      Staff: CRNA           /88 (12/05/19 1514)    Temp 98 6 °F (37 °C) (12/05/19 1514)    Pulse 87 (12/05/19 1514)   Resp 16 (12/05/19 1514)    SpO2 99 % (12/05/19 1514)

## 2021-03-10 DIAGNOSIS — Z23 ENCOUNTER FOR IMMUNIZATION: ICD-10-CM

## 2023-08-17 RX ORDER — SODIUM CHLORIDE 9 MG/ML
125 INJECTION, SOLUTION INTRAVENOUS CONTINUOUS
Status: CANCELLED | OUTPATIENT
Start: 2023-08-17

## 2023-08-21 ENCOUNTER — ANESTHESIA EVENT (OUTPATIENT)
Dept: GASTROENTEROLOGY | Facility: HOSPITAL | Age: 68
End: 2023-08-21

## 2023-08-21 ENCOUNTER — ANESTHESIA (OUTPATIENT)
Dept: GASTROENTEROLOGY | Facility: HOSPITAL | Age: 68
End: 2023-08-21

## 2023-08-21 ENCOUNTER — HOSPITAL ENCOUNTER (OUTPATIENT)
Dept: GASTROENTEROLOGY | Facility: HOSPITAL | Age: 68
Setting detail: OUTPATIENT SURGERY
Discharge: HOME | End: 2023-08-21
Attending: COLON & RECTAL SURGERY
Payer: MEDICARE

## 2023-08-21 VITALS
OXYGEN SATURATION: 94 % | HEART RATE: 74 BPM | SYSTOLIC BLOOD PRESSURE: 139 MMHG | BODY MASS INDEX: 38.5 KG/M2 | HEIGHT: 74 IN | WEIGHT: 300 LBS | RESPIRATION RATE: 16 BRPM | TEMPERATURE: 97.8 F | DIASTOLIC BLOOD PRESSURE: 95 MMHG

## 2023-08-21 DIAGNOSIS — Z12.11 ENCOUNTER FOR SCREENING FOR MALIGNANT NEOPLASM OF COLON: ICD-10-CM

## 2023-08-21 PROBLEM — E66.09 CLASS 2 OBESITY DUE TO EXCESS CALORIES IN ADULT: Status: ACTIVE | Noted: 2023-08-21

## 2023-08-21 LAB — GLUCOSE SERPL-MCNC: 111 MG/DL (ref 65–140)

## 2023-08-21 PROCEDURE — 88305 TISSUE EXAM BY PATHOLOGIST: CPT | Performed by: PATHOLOGY

## 2023-08-21 PROCEDURE — 82948 REAGENT STRIP/BLOOD GLUCOSE: CPT

## 2023-08-21 RX ORDER — PROPOFOL 10 MG/ML
INJECTION, EMULSION INTRAVENOUS AS NEEDED
Status: DISCONTINUED | OUTPATIENT
Start: 2023-08-21 | End: 2023-08-21

## 2023-08-21 RX ORDER — SODIUM CHLORIDE 9 MG/ML
125 INJECTION, SOLUTION INTRAVENOUS CONTINUOUS
Status: DISCONTINUED | OUTPATIENT
Start: 2023-08-21 | End: 2023-08-25 | Stop reason: HOSPADM

## 2023-08-21 RX ADMIN — PROPOFOL 30 MG: 10 INJECTION, EMULSION INTRAVENOUS at 08:34

## 2023-08-21 RX ADMIN — PROPOFOL 20 MG: 10 INJECTION, EMULSION INTRAVENOUS at 08:38

## 2023-08-21 RX ADMIN — PROPOFOL 110 MG: 10 INJECTION, EMULSION INTRAVENOUS at 08:24

## 2023-08-21 RX ADMIN — PROPOFOL 50 MG: 10 INJECTION, EMULSION INTRAVENOUS at 08:27

## 2023-08-21 RX ADMIN — SODIUM CHLORIDE 125 ML/HR: 0.9 INJECTION, SOLUTION INTRAVENOUS at 07:54

## 2023-08-21 RX ADMIN — PROPOFOL 40 MG: 10 INJECTION, EMULSION INTRAVENOUS at 08:30

## 2023-08-21 RX ADMIN — PROPOFOL 20 MG: 10 INJECTION, EMULSION INTRAVENOUS at 08:43

## 2023-08-21 NOTE — INTERVAL H&P NOTE
H&P reviewed. After examining the patient I find no changes in the patients condition since the H&P had been written.     Vitals:    08/21/23 0744   BP: (!) 172/97   Pulse:    Resp:    Temp:    SpO2:

## 2023-08-21 NOTE — ANESTHESIA PREPROCEDURE EVALUATION
Procedure:  COLONOSCOPY    Relevant Problems   CARDIO   (+) Essential hypertension   (+) Hyperlipidemia      ENDO   (+) Hypothyroidism      /RENAL   (+) Prostate nodule      HEMATOLOGY   (+) Thrombocytopenia (HCC)      MUSCULOSKELETAL   (+) Gout      NEURO/PSYCH   (+) Depressive disorder        Physical Exam    Airway    Mallampati score: III  TM Distance: >3 FB  Neck ROM: full     Dental   No notable dental hx     Cardiovascular  Rhythm: regular, Rate: normal, Cardiovascular exam normal    Pulmonary  Pulmonary exam normal Breath sounds clear to auscultation,     Other Findings        Anesthesia Plan  ASA Score- 3     Anesthesia Type- IV sedation with anesthesia with ASA Monitors. Additional Monitors:   Airway Plan:           Plan Factors-    Chart reviewed. Existing labs reviewed. Patient summary reviewed. Patient is not a current smoker. Patient not instructed to abstain from smoking on day of procedure. Patient did not smoke on day of surgery. Induction- intravenous. Postoperative Plan-     Informed Consent- Anesthetic plan and risks discussed with patient.

## 2023-08-21 NOTE — ANESTHESIA POSTPROCEDURE EVALUATION
Post-Op Assessment Note    CV Status:  Stable    Pain management: adequate     Mental Status:  Alert and awake   Hydration Status:  Euvolemic   PONV Controlled:  Controlled   Airway Patency:  Patent      Post Op Vitals Reviewed: Yes      Staff: Anesthesiologist, CRNA         No notable events documented.     BP      Temp      Pulse     Resp      SpO2      /95   Pulse 74   Temp 97.8 °F (36.6 °C) (Temporal)   Resp 16   Ht 6' 2" (1.88 m)   Wt 136 kg (300 lb)   SpO2 94%   BMI 38.52 kg/m²

## 2023-08-21 NOTE — DISCHARGE INSTRUCTIONS
[facility] [address] [place] [address] S. [address] [facility], [address] [place] [address]  Phone: [phone]    DISCHARGE INSTRUCTIONS:    1.  ___ Complete Exam - Normal    2.  ___ Exam normal, but entire colon not seen. We will discuss this with you. 3.  ___ Polyp(s) removed by "burning" - NO pathology report will follow    4.  __1_ Polyp(s) removed by excision. Pathology report will be available in 4-5 days   Someone from our office will call you with results. 5.  ___ Exam prompted biopsies. Pathology report will be available in 4-5 days   Someone from our office will call you with results. 6.  ___ Exam demonstrated findings that need treatment. Prescriptions will be   Given to you. Return to our office in ____ weeks. Please call for appt. 7.  ___ Original office visit or colonoscopy findings necessitate an office visit. Please call to set up a new appointment    8.  ___ Medication  __________________________________________        [facility]:    - Go straight home and rest today    - No driving or drinking alcohol for 24 hours    - Resume regular diet and medications unless otherwise instructed. Coumadin and Plavix are blood thinners. You can resume these medications on __________.     IF YOU ARE HAVING ANY FEVER, BLEEDING OR PERSISTENT PAIN IN THE ABDOMEN, PLEASE CALL OUR OFFICE ANY DAY OR TIME  [phone]

## 2023-08-23 PROCEDURE — 88305 TISSUE EXAM BY PATHOLOGIST: CPT | Performed by: PATHOLOGY

## (undated) DEVICE — ELECTRODE 8227410 PAIRED 2 CH SET ROHS

## (undated) DEVICE — CHLORAPREP HI-LITE 26ML ORANGE

## (undated) DEVICE — SUT MONOCRYL 4-0 PS-2 18 IN Y496G

## (undated) DEVICE — PAD GROUNDING ADULT

## (undated) DEVICE — PACK UNIVERSAL NECK

## (undated) DEVICE — SUT SILK 2-0 SH 30 IN K833H

## (undated) DEVICE — BIPOLAR CORD DISP

## (undated) DEVICE — PLUMEPEN PRO 10FT

## (undated) DEVICE — SYRINGE 10ML LL CONTROL TOP

## (undated) DEVICE — 3M™ STERI-STRIP™ REINFORCED ADHESIVE SKIN CLOSURES, R1547, 1/2 IN X 4 IN (12 MM X 100 MM), 6 STRIPS/ENVELOPE: Brand: 3M™ STERI-STRIP™

## (undated) DEVICE — LIGHT HANDLE COVER SLEEVE DISP BLUE STELLAR

## (undated) DEVICE — PROBE 8225101 5PK STD PRASS FL TIP ROHS

## (undated) DEVICE — GLOVE INDICATOR PI UNDERGLOVE SZ 7.5 BLUE

## (undated) DEVICE — SUT SILK 3-0 18 IN A184H

## (undated) DEVICE — EMG TUBE 8229706 NIM TRIVANTAGE 6.0MM ID: Brand: NIM TRIVANTAGE™

## (undated) DEVICE — SYRINGE 10ML LL

## (undated) DEVICE — ELECTRODE BLADE MOD E-Z CLEAN 2.5IN 6.4CM -0012M

## (undated) DEVICE — INTENDED FOR TISSUE SEPARATION, AND OTHER PROCEDURES THAT REQUIRE A SHARP SURGICAL BLADE TO PUNCTURE OR CUT.: Brand: BARD-PARKER SAFETY BLADES SIZE 15, STERILE

## (undated) DEVICE — EMG TUBE 8229708 NIM TRIVANTAGE 8.0MM ID: Brand: NIM TRIVANTAGE™

## (undated) DEVICE — ELECTRODE 8227411 PAIRED 4 CH SET ROHS

## (undated) DEVICE — NEEDLE 25GA X 1 IN SAFETY GLIDE

## (undated) DEVICE — TIBURON SPLIT SHEET: Brand: CONVERTORS

## (undated) DEVICE — GLOVE SRG BIOGEL 7.5

## (undated) DEVICE — SUT VICRYL 3-0 SH 27 IN J416H

## (undated) DEVICE — EMG TUBE 8229707 NIM TRIVANTAGE 7.0MM ID: Brand: NIM TRIVANTAGE™